# Patient Record
Sex: MALE | Race: WHITE | NOT HISPANIC OR LATINO | URBAN - METROPOLITAN AREA
[De-identification: names, ages, dates, MRNs, and addresses within clinical notes are randomized per-mention and may not be internally consistent; named-entity substitution may affect disease eponyms.]

---

## 2021-06-20 ENCOUNTER — INPATIENT (INPATIENT)
Facility: HOSPITAL | Age: 71
LOS: 1 days | Discharge: HOME HEALTH | DRG: 482 | End: 2021-06-22
Attending: STUDENT IN AN ORGANIZED HEALTH CARE EDUCATION/TRAINING PROGRAM | Admitting: STUDENT IN AN ORGANIZED HEALTH CARE EDUCATION/TRAINING PROGRAM
Payer: MEDICARE

## 2021-06-20 VITALS
HEIGHT: 70 IN | WEIGHT: 156.09 LBS | RESPIRATION RATE: 16 BRPM | DIASTOLIC BLOOD PRESSURE: 73 MMHG | HEART RATE: 70 BPM | OXYGEN SATURATION: 99 % | SYSTOLIC BLOOD PRESSURE: 133 MMHG | TEMPERATURE: 99 F

## 2021-06-20 DIAGNOSIS — S72.141A DISPLACED INTERTROCHANTERIC FRACTURE OF RIGHT FEMUR, INITIAL ENCOUNTER FOR CLOSED FRACTURE: ICD-10-CM

## 2021-06-20 DIAGNOSIS — S72.001A FRACTURE OF UNSPECIFIED PART OF NECK OF RIGHT FEMUR, INITIAL ENCOUNTER FOR CLOSED FRACTURE: ICD-10-CM

## 2021-06-20 DIAGNOSIS — C61 MALIGNANT NEOPLASM OF PROSTATE: ICD-10-CM

## 2021-06-20 DIAGNOSIS — Z01.818 ENCOUNTER FOR OTHER PREPROCEDURAL EXAMINATION: ICD-10-CM

## 2021-06-20 DIAGNOSIS — Z90.79 ACQUIRED ABSENCE OF OTHER GENITAL ORGAN(S): Chronic | ICD-10-CM

## 2021-06-20 DIAGNOSIS — E03.9 HYPOTHYROIDISM, UNSPECIFIED: ICD-10-CM

## 2021-06-20 LAB
ALBUMIN SERPL ELPH-MCNC: 4.6 G/DL — SIGNIFICANT CHANGE UP (ref 3.3–5)
ALP SERPL-CCNC: 66 U/L — SIGNIFICANT CHANGE UP (ref 40–120)
ALT FLD-CCNC: 16 U/L — SIGNIFICANT CHANGE UP (ref 10–45)
ANION GAP SERPL CALC-SCNC: 14 MMOL/L — SIGNIFICANT CHANGE UP (ref 5–17)
APPEARANCE UR: CLEAR — SIGNIFICANT CHANGE UP
APTT BLD: 30.8 SEC — SIGNIFICANT CHANGE UP (ref 27.5–35.5)
AST SERPL-CCNC: 24 U/L — SIGNIFICANT CHANGE UP (ref 10–40)
BASOPHILS # BLD AUTO: 0.02 K/UL — SIGNIFICANT CHANGE UP (ref 0–0.2)
BASOPHILS NFR BLD AUTO: 0.2 % — SIGNIFICANT CHANGE UP (ref 0–2)
BILIRUB SERPL-MCNC: 0.6 MG/DL — SIGNIFICANT CHANGE UP (ref 0.2–1.2)
BILIRUB UR-MCNC: NEGATIVE — SIGNIFICANT CHANGE UP
BLD GP AB SCN SERPL QL: NEGATIVE — SIGNIFICANT CHANGE UP
BUN SERPL-MCNC: 15 MG/DL — SIGNIFICANT CHANGE UP (ref 7–23)
CALCIUM SERPL-MCNC: 9.8 MG/DL — SIGNIFICANT CHANGE UP (ref 8.4–10.5)
CHLORIDE SERPL-SCNC: 97 MMOL/L — SIGNIFICANT CHANGE UP (ref 96–108)
CO2 SERPL-SCNC: 22 MMOL/L — SIGNIFICANT CHANGE UP (ref 22–31)
COLOR SPEC: COLORLESS — SIGNIFICANT CHANGE UP
CREAT SERPL-MCNC: 0.85 MG/DL — SIGNIFICANT CHANGE UP (ref 0.5–1.3)
DIFF PNL FLD: NEGATIVE — SIGNIFICANT CHANGE UP
EOSINOPHIL # BLD AUTO: 0.03 K/UL — SIGNIFICANT CHANGE UP (ref 0–0.5)
EOSINOPHIL NFR BLD AUTO: 0.3 % — SIGNIFICANT CHANGE UP (ref 0–6)
GLUCOSE SERPL-MCNC: 96 MG/DL — SIGNIFICANT CHANGE UP (ref 70–99)
GLUCOSE UR QL: NEGATIVE — SIGNIFICANT CHANGE UP
HCT VFR BLD CALC: 40.5 % — SIGNIFICANT CHANGE UP (ref 39–50)
HGB BLD-MCNC: 13.4 G/DL — SIGNIFICANT CHANGE UP (ref 13–17)
IMM GRANULOCYTES NFR BLD AUTO: 0.8 % — SIGNIFICANT CHANGE UP (ref 0–1.5)
INR BLD: 0.95 RATIO — SIGNIFICANT CHANGE UP (ref 0.88–1.16)
KETONES UR-MCNC: ABNORMAL
LEUKOCYTE ESTERASE UR-ACNC: NEGATIVE — SIGNIFICANT CHANGE UP
LYMPHOCYTES # BLD AUTO: 0.51 K/UL — LOW (ref 1–3.3)
LYMPHOCYTES # BLD AUTO: 5 % — LOW (ref 13–44)
MCHC RBC-ENTMCNC: 29.3 PG — SIGNIFICANT CHANGE UP (ref 27–34)
MCHC RBC-ENTMCNC: 33.1 GM/DL — SIGNIFICANT CHANGE UP (ref 32–36)
MCV RBC AUTO: 88.6 FL — SIGNIFICANT CHANGE UP (ref 80–100)
MONOCYTES # BLD AUTO: 0.61 K/UL — SIGNIFICANT CHANGE UP (ref 0–0.9)
MONOCYTES NFR BLD AUTO: 5.9 % — SIGNIFICANT CHANGE UP (ref 2–14)
NEUTROPHILS # BLD AUTO: 9.02 K/UL — HIGH (ref 1.8–7.4)
NEUTROPHILS NFR BLD AUTO: 87.8 % — HIGH (ref 43–77)
NITRITE UR-MCNC: NEGATIVE — SIGNIFICANT CHANGE UP
NRBC # BLD: 0 /100 WBCS — SIGNIFICANT CHANGE UP (ref 0–0)
PH UR: 6 — SIGNIFICANT CHANGE UP (ref 5–8)
PLATELET # BLD AUTO: 274 K/UL — SIGNIFICANT CHANGE UP (ref 150–400)
POTASSIUM SERPL-MCNC: 3.8 MMOL/L — SIGNIFICANT CHANGE UP (ref 3.5–5.3)
POTASSIUM SERPL-SCNC: 3.8 MMOL/L — SIGNIFICANT CHANGE UP (ref 3.5–5.3)
PROT SERPL-MCNC: 7.4 G/DL — SIGNIFICANT CHANGE UP (ref 6–8.3)
PROT UR-MCNC: NEGATIVE — SIGNIFICANT CHANGE UP
PROTHROM AB SERPL-ACNC: 11.4 SEC — SIGNIFICANT CHANGE UP (ref 10.6–13.6)
RBC # BLD: 4.57 M/UL — SIGNIFICANT CHANGE UP (ref 4.2–5.8)
RBC # FLD: 13.6 % — SIGNIFICANT CHANGE UP (ref 10.3–14.5)
RH IG SCN BLD-IMP: POSITIVE — SIGNIFICANT CHANGE UP
SARS-COV-2 RNA SPEC QL NAA+PROBE: SIGNIFICANT CHANGE UP
SODIUM SERPL-SCNC: 133 MMOL/L — LOW (ref 135–145)
SP GR SPEC: 1.01 — LOW (ref 1.01–1.02)
UROBILINOGEN FLD QL: NEGATIVE — SIGNIFICANT CHANGE UP
WBC # BLD: 10.27 K/UL — SIGNIFICANT CHANGE UP (ref 3.8–10.5)
WBC # FLD AUTO: 10.27 K/UL — SIGNIFICANT CHANGE UP (ref 3.8–10.5)

## 2021-06-20 PROCEDURE — 99285 EMERGENCY DEPT VISIT HI MDM: CPT | Mod: GC

## 2021-06-20 PROCEDURE — 73552 X-RAY EXAM OF FEMUR 2/>: CPT | Mod: 26,RT

## 2021-06-20 PROCEDURE — 71045 X-RAY EXAM CHEST 1 VIEW: CPT | Mod: 26

## 2021-06-20 PROCEDURE — 73502 X-RAY EXAM HIP UNI 2-3 VIEWS: CPT | Mod: 26,RT

## 2021-06-20 PROCEDURE — 99223 1ST HOSP IP/OBS HIGH 75: CPT | Mod: GC

## 2021-06-20 RX ORDER — TRAMADOL HYDROCHLORIDE 50 MG/1
50 TABLET ORAL EVERY 4 HOURS
Refills: 0 | Status: DISCONTINUED | OUTPATIENT
Start: 2021-06-21 | End: 2021-06-22

## 2021-06-20 RX ORDER — LEVOTHYROXINE SODIUM 125 MCG
25 TABLET ORAL DAILY
Refills: 0 | Status: DISCONTINUED | OUTPATIENT
Start: 2021-06-20 | End: 2021-06-20

## 2021-06-20 RX ORDER — LEVOTHYROXINE SODIUM 125 MCG
25 TABLET ORAL DAILY
Refills: 0 | Status: DISCONTINUED | OUTPATIENT
Start: 2021-06-21 | End: 2021-06-22

## 2021-06-20 RX ORDER — SENNA PLUS 8.6 MG/1
2 TABLET ORAL AT BEDTIME
Refills: 0 | Status: DISCONTINUED | OUTPATIENT
Start: 2021-06-20 | End: 2021-06-20

## 2021-06-20 RX ORDER — POLYETHYLENE GLYCOL 3350 17 G/17G
17 POWDER, FOR SOLUTION ORAL DAILY
Refills: 0 | Status: DISCONTINUED | OUTPATIENT
Start: 2021-06-21 | End: 2021-06-22

## 2021-06-20 RX ORDER — BENZOCAINE AND MENTHOL 5; 1 G/100ML; G/100ML
1 LIQUID ORAL
Refills: 0 | Status: DISCONTINUED | OUTPATIENT
Start: 2021-06-21 | End: 2021-06-22

## 2021-06-20 RX ORDER — OXYCODONE HYDROCHLORIDE 5 MG/1
2.5 TABLET ORAL EVERY 4 HOURS
Refills: 0 | Status: DISCONTINUED | OUTPATIENT
Start: 2021-06-20 | End: 2021-06-20

## 2021-06-20 RX ORDER — CHOLECALCIFEROL (VITAMIN D3) 125 MCG
2000 CAPSULE ORAL DAILY
Refills: 0 | Status: DISCONTINUED | OUTPATIENT
Start: 2021-06-21 | End: 2021-06-22

## 2021-06-20 RX ORDER — OXYCODONE HYDROCHLORIDE 5 MG/1
5 TABLET ORAL EVERY 4 HOURS
Refills: 0 | Status: DISCONTINUED | OUTPATIENT
Start: 2021-06-20 | End: 2021-06-20

## 2021-06-20 RX ORDER — MAGNESIUM HYDROXIDE 400 MG/1
30 TABLET, CHEWABLE ORAL DAILY
Refills: 0 | Status: DISCONTINUED | OUTPATIENT
Start: 2021-06-21 | End: 2021-06-22

## 2021-06-20 RX ORDER — OXYCODONE HYDROCHLORIDE 5 MG/1
5 TABLET ORAL EVERY 4 HOURS
Refills: 0 | Status: DISCONTINUED | OUTPATIENT
Start: 2021-06-21 | End: 2021-06-22

## 2021-06-20 RX ORDER — ACETAMINOPHEN 500 MG
975 TABLET ORAL EVERY 8 HOURS
Refills: 0 | Status: DISCONTINUED | OUTPATIENT
Start: 2021-06-20 | End: 2021-06-20

## 2021-06-20 RX ORDER — SENNA PLUS 8.6 MG/1
2 TABLET ORAL AT BEDTIME
Refills: 0 | Status: DISCONTINUED | OUTPATIENT
Start: 2021-06-21 | End: 2021-06-22

## 2021-06-20 RX ORDER — SODIUM CHLORIDE 9 MG/ML
1000 INJECTION, SOLUTION INTRAVENOUS
Refills: 0 | Status: DISCONTINUED | OUTPATIENT
Start: 2021-06-21 | End: 2021-06-22

## 2021-06-20 RX ORDER — OXYCODONE HYDROCHLORIDE 5 MG/1
2.5 TABLET ORAL EVERY 4 HOURS
Refills: 0 | Status: DISCONTINUED | OUTPATIENT
Start: 2021-06-21 | End: 2021-06-22

## 2021-06-20 RX ORDER — ONDANSETRON 8 MG/1
4 TABLET, FILM COATED ORAL EVERY 6 HOURS
Refills: 0 | Status: DISCONTINUED | OUTPATIENT
Start: 2021-06-21 | End: 2021-06-22

## 2021-06-20 RX ORDER — MORPHINE SULFATE 50 MG/1
4 CAPSULE, EXTENDED RELEASE ORAL ONCE
Refills: 0 | Status: DISCONTINUED | OUTPATIENT
Start: 2021-06-20 | End: 2021-06-20

## 2021-06-20 RX ORDER — LEVOTHYROXINE SODIUM 125 MCG
1 TABLET ORAL
Qty: 0 | Refills: 0 | DISCHARGE

## 2021-06-20 RX ORDER — LANOLIN ALCOHOL/MO/W.PET/CERES
3 CREAM (GRAM) TOPICAL AT BEDTIME
Refills: 0 | Status: DISCONTINUED | OUTPATIENT
Start: 2021-06-21 | End: 2021-06-22

## 2021-06-20 RX ORDER — SODIUM CHLORIDE 9 MG/ML
1000 INJECTION INTRAMUSCULAR; INTRAVENOUS; SUBCUTANEOUS
Refills: 0 | Status: DISCONTINUED | OUTPATIENT
Start: 2021-06-20 | End: 2021-06-20

## 2021-06-20 RX ORDER — ACETAMINOPHEN 500 MG
975 TABLET ORAL EVERY 8 HOURS
Refills: 0 | Status: DISCONTINUED | OUTPATIENT
Start: 2021-06-21 | End: 2021-06-22

## 2021-06-20 RX ADMIN — Medication 975 MILLIGRAM(S): at 23:07

## 2021-06-20 RX ADMIN — MORPHINE SULFATE 4 MILLIGRAM(S): 50 CAPSULE, EXTENDED RELEASE ORAL at 18:54

## 2021-06-20 RX ADMIN — SODIUM CHLORIDE 125 MILLILITER(S): 9 INJECTION INTRAMUSCULAR; INTRAVENOUS; SUBCUTANEOUS at 23:08

## 2021-06-20 NOTE — CONSULT NOTE ADULT - PROBLEM SELECTOR RECOMMENDATION 2
- c/w home levothyroxine Pending R femoral neck fracture repair by orthopedics. Patient without any history of cardiac or pulmonary disease. No AC/AP use. EKG 1st degree AV block, no ischemic changes. Good exercise capacity (>4 METS). No tobacco, drug or etoh use. No history of anesthetic complications.     RCRI score 0 (Class I risk)  ALPHONSE score 0%    Patient is low risk for intermediate risk procedure. No further medical optimization warranted. Acceptable to proceed with urgent surgery without further medical work-up.

## 2021-06-20 NOTE — CONSULT NOTE ADULT - ASSESSMENT
70 y/o M PMHx prostate CA (s/p radical prostatectomy in 03/2021), chronic back pain, and hypothyroidism, initially presented following mechanical fall at home. Admitted for R femoral neck fracture, pending OR with ortho. Medicine consulted for pre-op risk stratification.

## 2021-06-20 NOTE — ED PROVIDER NOTE - CLINICAL SUMMARY MEDICAL DECISION MAKING FREE TEXT BOX
71M w/ h/o prostate ca, s/p prostatectomy, PET scan last week that was negative, presents w/ mechanical fall and R hip pain, will xray to r/o fracture, pain control, no syncope, no loc, no lightheadedness, no chest pain

## 2021-06-20 NOTE — H&P ADULT - NSHPPHYSICALEXAM_GEN_ALL_CORE
RLE:  Skin intact; No ecchymosis/soft tissue swelling  Compartments soft; + TTP about hip. No TTP to knee/leg/ankle/foot   ROM lmited 2/2 pain   Unable to SLR; + Heel Strike  Motor intact GS/TA/FHL/EHL  SILT L2-S1  DP/PT pulses 2+    LLE/BUE:   No bony TTP; Good ROM w/o pain; Exam Unremarkable

## 2021-06-20 NOTE — ED PROVIDER NOTE - PHYSICAL EXAMINATION
CONSTITUTIONAL: NAD, awake, alert  HEAD: Normocephalic; atraumatic  ENMT: External appears normal, MMM  CARD: Normal Sl, S2; no audible murmurs  RESP: normal wob, lungs ctab  ABD: soft, non-distended; non-tender  MSK: + R hip tenderness, R leg is externally rotated, pulse intact, good cap refill, no edema  SKIN: Warm, dry, no rashes  NEURO: aaox3, moving all extremities spontaneously

## 2021-06-20 NOTE — H&P ADULT - HISTORY OF PRESENT ILLNESS
71[facility]/o severe R hip pain and inability to ambulate.  Patient denies headstrike or LOC. Patient denies radiation of pain. Patient denies numbness/tingling/burning in the RLE. No other bone/joint complaints. Patient is a community ambulator at baseline with/without assistive devices.                                    13.4   10.27 )-----------( 274      ( 20 Jun 2021 18:00 )             40.5     06-20    133<L>  |  97  |  15  ----------------------------<  96  3.8   |  22  |  0.85    Ca    9.8      20 Jun 2021 18:00    TPro  7.4  /  Alb  4.6  /  TBili  0.6  /  DBili  x   /  AST  24  /  ALT  16  /  AlkPhos  66  06-20    PT/INR - ( 20 Jun 2021 18:01 )   PT: 11.4 sec;   INR: 0.95 ratio         PTT - ( 20 Jun 2021 18:01 )  PTT:30.8 sec    T(C): 36.8 (06-20-21 @ 18:07), Max: 37.1 (06-20-21 @ 17:33)  HR: 68 (06-20-21 @ 19:08) (68 - 72)  BP: 127/81 (06-20-21 @ 19:08) (127/81 - 133/73)  RR: 15 (06-20-21 @ 19:08) (15 - 18)  SpO2: 100% (06-20-21 @ 19:08) (95% - 100%)  Wt(kg): --

## 2021-06-20 NOTE — H&P ADULT - ASSESSMENT
A/P: 71y Male with right intertroch fracture, going to OR tonight for IMN    - Admit to ortho  - Pain control  - NPO/IVF  - CBC/BMP/Coags/UA/T+S x2  - EKG/CXR  - Medical clearance  - Plan for OR for ORIF tonight

## 2021-06-20 NOTE — ED ADULT NURSE REASSESSMENT NOTE - NS ED NURSE REASSESS COMMENT FT1
patient completely undressed for OR. patients clothing, belongings, and valuables including cell phone, wallet, and apple watch secured with patients wife

## 2021-06-20 NOTE — ED ADULT NURSE NOTE - NSIMPLEMENTINTERV_GEN_ALL_ED
Implemented All Fall Risk Interventions:  [place] to call system. Call bell, personal items and telephone within reach. Instruct patient to call for assistance. Room bathroom lighting operational. Non-slip footwear when patient is off stretcher. Physically safe environment: no spills, clutter or unnecessary equipment. Stretcher in lowest position, wheels locked, appropriate side rails in place. Provide visual cue, wrist band, yellow gown, etc. Monitor gait and stability. Monitor for mental status changes and reorient to person, place, and time. Review medications for side effects contributing to fall risk. Reinforce activity limits and safety measures with patient and family.

## 2021-06-20 NOTE — ED PROVIDER NOTE - NS ED ROS FT
General: denies fever, chills  HENT: denies nasal congestion, rhinorrhea  Eyes: denies visual changes, blurred vision  CV: denies chest pain, palpitations  Resp: denies difficulty breathing, cough  Abdominal: denies nausea, vomiting, diarrhea, abdominal pain  MSK: denies muscle aches, leg swelling, + R hip pain  Neuro: denies headaches, numbness, tingling  Skin: denies rashes, bruises  Heme: denies petechia, abnormal bleeding

## 2021-06-20 NOTE — ED PROVIDER NOTE - OBJECTIVE STATEMENT
71M hx prostate ca s/p radical prostatectomy 3/1, spinal radiculopathy chronic (neck pain/ low back pain), hypothyroidism, who presents w/ right hip pain s/p mechanical fall at home, pt recalls full event, states he slipped, denies head trauma/ LOC or blood thinners. Denies chest pain or new back pain. Received morphine 4mg at approx 1650 by ems.

## 2021-06-20 NOTE — ED PROVIDER NOTE - ATTENDING CONTRIBUTION TO CARE
Mcgee DO: 71M hx prostate ca s/p radical prostatectomy 3/1, spinal radiculopathy chronic (neck pain/ low back pain), hypothyroidism, who presents w/ right hip pain s/p mechanical fall at home, pt recalls full event, states he slipped, denies head trauma/ LOC or blood thinners. Denies chest pain or new back pain. Received morphine 4mg at approx 1650 by ems. On eval aox3, nad, heart s1s2 no murmurs, lungs cta b/l, abd soft ntnd, no chest wall ttp, ROM of all extremities wnl except for R hip limited ROM due hip pain, pelvis stable, 2+ DP pulses b/l, sensation intact. Plan: rule out fx right hip/ femur, pre-op labs, analgesia, possible admission if cannot ambulate.

## 2021-06-20 NOTE — CONSULT NOTE ADULT - SUBJECTIVE AND OBJECTIVE BOX
CC: Patient is a 71y old  Male who presents with a chief complaint of R hip pain    HPI:  72 y/o M PMHx prostate CA (s/p radical prostatectomy in 03/2021), chronic back pain, and hypothyroidism, initially presented following mechanical fall at home. Found to have R femoral neck fracture in the ED. Ortho admitted patient and planning to take to OR urgently, medicine consulted for pre-op risk stratification. Patient reports mechanical fall at home today, slipped and fell, no syncope/LOC or head trauma. Notes pain in R hip with movement. Had recent radical prostatectomy in 03/2021 without complications. No prior adverse effects from anesthesia. At baseline, independent in all ADLs, ambulates independently. Reports good exercise tolerance, able to climb several flights of stairs without SOB/CP. No prior cardiac history. No history of easy bleeding, not on any AC/AP. No history of tobacco use. Drinks socially, no drug use.     HOME MEDICATIONS:  Synthroid 25mcg daily    PAST MEDICAL & SURGICAL HISTORY:  Prostate cancer  Hypothyroidism    FAMILY Hx:  No significant family history of cardiac disease    REVIEW OF SYSTEMS:  CONSTITUTIONAL: No fever, weight loss, or fatigue  EYES: No eye pain, visual disturbances, or discharge  ENMT:  No difficulty hearing, tinnitus, vertigo; No sinus or throat pain  NECK: No pain or stiffness  RESPIRATORY: No cough, wheezing, or shortness of breath  CARDIOVASCULAR: No chest pain, palpitations, dizziness, or leg swelling  GASTROINTESTINAL: No abdominal or epigastric pain. No nausea, vomiting, or hematochezia.  GENITOURINARY: No dysuria, frequency, hematuria, or incontinence  NEUROLOGICAL: No headaches, numbness, or tremors  SKIN: No itching, burning, rashes, or lesions   ENDOCRINE: No heat or cold intolerance; No hair loss  MUSCULOSKELETAL: +R hip pain, +Lower back pain   HEME/LYMPH: No easy bruising or bleeding gums      T(C): 36.8 (06-20-21 @ 18:07), Max: 37.1 (06-20-21 @ 17:33)  HR: 68 (06-20-21 @ 19:08) (68 - 72)  BP: 127/81 (06-20-21 @ 19:08) (127/81 - 133/73)  RR: 15 (06-20-21 @ 19:08) (15 - 18)  SpO2: 100% (06-20-21 @ 19:08) (95% - 100%)  Wt(kg): --Vital Signs Last 24 Hrs  T(C): 36.8 (20 Jun 2021 18:07), Max: 37.1 (20 Jun 2021 17:33)  T(F): 98.2 (20 Jun 2021 18:07), Max: 98.8 (20 Jun 2021 17:33)  HR: 68 (20 Jun 2021 19:08) (68 - 72)  BP: 127/81 (20 Jun 2021 19:08) (127/81 - 133/73)  BP(mean): --  RR: 15 (20 Jun 2021 19:08) (15 - 18)  SpO2: 100% (20 Jun 2021 19:08) (95% - 100%)    PHYSICAL EXAM:  GENERAL: NAD, well-developed  HEAD:  Atraumatic, Normocephalic  EYES: EOMI, PERRLA, conjunctiva and sclera clear  ENMT: No tonsillar erythema, exudates, or enlargement; Moist mucous membranes  NECK: Supple, No JVD  NERVOUS SYSTEM:  Alert & Oriented X3, CNs grossly intact  CHEST/LUNG: Clear to auscultation bilaterally; No rales, rhonchi, wheezing, or rubs  HEART: Regular rate and rhythm; No murmurs, rubs, or gallops  ABDOMEN: Soft, Nontender, Nondistended; Bowel sounds present  EXTREMITIES:  2+ Peripheral Pulses, No clubbing, cyanosis, or edema  SKIN: No rashes or lesions      Consultant(s) Notes Reviewed:  [x ] YES  [ ] NO  Care Discussed with Consultants/Other Providers [ x] YES  [ ] NO    LABS:  CBC   06-20-21 @ 18:00  Hematcorit 40.5  Hemoglobin 13.4  Mean Cell Hemoglobin 29.3  Platelet Count-Automated 274  RBC Count 4.57  Red Cell Distrib Width 13.6  Wbc Count 10.27    BMP  06-20-21 @ 18:00  Anion Gap. Serum 14  Blood Urea Nitrogen,Serm 15  Calcium, Total Serum 9.8  Carbon Dioxide, Serum 22  Chloride, Serum 97  Creatinine, Serum 0.85  eGFR in  101  eGFR in Non Afican American 87  Gloucose, serum 96  Potassium, Serum 3.8  Sodium, Serum 133    CMP  06-20-21 @ 18:00  Jeanie Aminotransferase(ALT/SGPT)16  Albumin, Serum 4.6  Alkaline Phosphatase, Serum 66  Anion Gap, Serum 14  Aspartate Aminotransferase (AST/SGOT)24  Bilirubin Total, Serum 0.6  Blood Urea Nitrogen, Serum 15  Calcium,Total Serum 9.8  Carbon Dioxide, Serum 22  Chloride, Serum 97  Creatinine, Serum 0.85  eGFR if  101  eGFR if Non African American 87  Glucose, Serum 96  Potassium, Serum 3.8  Protein Total, Serum 7.4  Sodium, Serum 133      PT/INR  PT/INR  06-20-21 @ 18:01  INR 0.95  Prothrombin Time Comment --  Prothrobin Time, [id number]        RADIOLOGY & ADDITIONAL TESTS:  < from: Xray Hip w/ Pelvis 2 or 3 Views, Right (06.20.21 @ 18:20) >  EXAM:  XR HIP WITH PELV 2-3V RT                        PROCEDURE DATE:  06/20/2021    ******PRELIMINARY REPORT******    ******PRELIMINARY REPORT******        INTERPRETATION:  Acute right femoral neck fracture. F/u official report.        Imaging Personally Reviewed:  [ x ] YES  [ ] NO CC: Patient is a 71y old  Male who presents with a chief complaint of R hip pain    HPI:  72 y/o M PMHx prostate CA (s/p radical prostatectomy in 03/2021), chronic back pain, and hypothyroidism, initially presented following mechanical fall at home. Found to have R femoral neck fracture in the ED. Ortho admitted patient and planning to take to OR urgently, medicine consulted for pre-op risk stratification. Patient reports mechanical fall at home today, slipped and fell, no syncope/LOC or head trauma. Notes pain in R hip with movement. Had recent radical prostatectomy in 03/2021 without complications. No prior adverse effects from anesthesia. At baseline, independent in all ADLs, ambulates independently. Reports good exercise tolerance, able to climb several flights of stairs without SOB/CP. No prior cardiac history. No history of easy bleeding, not on any AC/AP. No history of tobacco use. Drinks socially, no drug use.     HOME MEDICATIONS:  Synthroid 25mcg daily    PAST MEDICAL & SURGICAL HISTORY:  Prostate cancer  Hypothyroidism    FAMILY Hx:  No significant family history of cardiac disease    REVIEW OF SYSTEMS:  CONSTITUTIONAL: No fever, weight loss, or fatigue  EYES: No eye pain, visual disturbances, or discharge  ENMT:  No difficulty hearing, tinnitus, vertigo; No sinus or throat pain  NECK: No pain or stiffness  RESPIRATORY: No cough, wheezing, or shortness of breath  CARDIOVASCULAR: No chest pain, palpitations, dizziness, or leg swelling  GASTROINTESTINAL: No abdominal or epigastric pain. No nausea, vomiting, or hematochezia.  GENITOURINARY: No dysuria, frequency, hematuria, or incontinence  NEUROLOGICAL: No headaches, numbness, or tremors  SKIN: No itching, burning, rashes, or lesions   ENDOCRINE: No heat or cold intolerance; No hair loss  MUSCULOSKELETAL: +R hip pain, +Lower back pain   HEME/LYMPH: No easy bruising or bleeding gums      T(C): 36.8 (06-20-21 @ 18:07), Max: 37.1 (06-20-21 @ 17:33)  HR: 68 (06-20-21 @ 19:08) (68 - 72)  BP: 127/81 (06-20-21 @ 19:08) (127/81 - 133/73)  RR: 15 (06-20-21 @ 19:08) (15 - 18)  SpO2: 100% (06-20-21 @ 19:08) (95% - 100%)  Wt(kg): --Vital Signs Last 24 Hrs  T(C): 36.8 (20 Jun 2021 18:07), Max: 37.1 (20 Jun 2021 17:33)  T(F): 98.2 (20 Jun 2021 18:07), Max: 98.8 (20 Jun 2021 17:33)  HR: 68 (20 Jun 2021 19:08) (68 - 72)  BP: 127/81 (20 Jun 2021 19:08) (127/81 - 133/73)  BP(mean): --  RR: 15 (20 Jun 2021 19:08) (15 - 18)  SpO2: 100% (20 Jun 2021 19:08) (95% - 100%)    PHYSICAL EXAM:  GENERAL: NAD, well-developed  HEAD:  Atraumatic, Normocephalic  EYES: EOMI, PERRLA, conjunctiva and sclera clear  ENMT: No tonsillar erythema, exudates, or enlargement; Moist mucous membranes  NECK: Supple, No JVD  NERVOUS SYSTEM:  Alert & Oriented X3, CNs grossly intact  CHEST/LUNG: Clear to auscultation bilaterally; No rales, rhonchi, wheezing, or rubs  HEART: Regular rate and rhythm; No murmurs, rubs, or gallops  ABDOMEN: Soft, Nontender, Nondistended; Bowel sounds present  EXTREMITIES:  2+ Peripheral Pulses, No clubbing, cyanosis, or edema  SKIN: No rashes or lesions      Consultant(s) Notes Reviewed:  [x ] YES  [ ] NO  Care Discussed with Consultants/Other Providers [ x] YES  [ ] NO    LABS:  CBC   06-20-21 @ 18:00  Hematcorit 40.5  Hemoglobin 13.4  Mean Cell Hemoglobin 29.3  Platelet Count-Automated 274  RBC Count 4.57  Red Cell Distrib Width 13.6  Wbc Count 10.27    BMP  06-20-21 @ 18:00  Anion Gap. Serum 14  Blood Urea Nitrogen,Serm 15  Calcium, Total Serum 9.8  Carbon Dioxide, Serum 22  Chloride, Serum 97  Creatinine, Serum 0.85  eGFR in  101  eGFR in Non Afican American 87  Gloucose, serum 96  Potassium, Serum 3.8  Sodium, Serum 133    CMP  06-20-21 @ 18:00  Jeanie Aminotransferase(ALT/SGPT)16  Albumin, Serum 4.6  Alkaline Phosphatase, Serum 66  Anion Gap, Serum 14  Aspartate Aminotransferase (AST/SGOT)24  Bilirubin Total, Serum 0.6  Blood Urea Nitrogen, Serum 15  Calcium,Total Serum 9.8  Carbon Dioxide, Serum 22  Chloride, Serum 97  Creatinine, Serum 0.85  eGFR if  101  eGFR if Non African American 87  Glucose, Serum 96  Potassium, Serum 3.8  Protein Total, Serum 7.4  Sodium, Serum 133      PT/INR  PT/INR  06-20-21 @ 18:01  INR 0.95  Prothrombin Time Comment --  Prothrobin Time, [id number]        RADIOLOGY & ADDITIONAL TESTS:  < from: Xray Hip w/ Pelvis 2 or 3 Views, Right (06.20.21 @ 18:20) >  EXAM:  XR HIP WITH PELV 2-3V RT                        PROCEDURE DATE:  06/20/2021    ******PRELIMINARY REPORT******    ******PRELIMINARY REPORT******        INTERPRETATION:  Acute right femoral neck fracture. F/u official report.      EKG: First degree AV block, no ST-TW changes  Imaging Personally Reviewed:  [ x ] YES  [ ] NO CC: Patient is a 71y old  Male who presents with a chief complaint of R hip pain    HPI:  72 y/o M PMHx prostate CA (s/p radical prostatectomy in 03/2021), chronic back pain, and hypothyroidism, initially presented following mechanical fall at home. Found to have R femoral neck fracture in the ED. Ortho admitted patient and planning to take to OR urgently, medicine consulted for pre-op risk stratification. Patient reports mechanical fall at home today, slipped and fell, no syncope/LOC or head trauma. Notes pain in R hip with movement. Had recent radical prostatectomy in 03/2021 without complications. No prior adverse effects from anesthesia. At baseline, independent in all ADLs, ambulates independently. Reports good exercise tolerance, able to climb several flights of stairs without SOB/CP. No prior cardiac history. No history of easy bleeding, not on any AC/AP. No history of tobacco use. Drinks socially, no drug use.     HOME MEDICATIONS:  Synthroid 25mcg daily    PAST MEDICAL & SURGICAL HISTORY:  Prostate cancer  Hypothyroidism    FAMILY Hx:  No significant family history of cardiac disease    REVIEW OF SYSTEMS:  CONSTITUTIONAL: No fever, weight loss, or fatigue  EYES: No eye pain, visual disturbances, or discharge  ENMT:  No difficulty hearing, tinnitus, vertigo; No sinus or throat pain  NECK: No pain or stiffness  RESPIRATORY: No cough, wheezing, or shortness of breath  CARDIOVASCULAR: No chest pain, palpitations, dizziness, or leg swelling  GASTROINTESTINAL: No abdominal or epigastric pain. No nausea, vomiting, or hematochezia.  GENITOURINARY: No dysuria, frequency, hematuria, or incontinence  NEUROLOGICAL: No headaches, numbness, or tremors  SKIN: No itching, burning, rashes, or lesions   ENDOCRINE: No heat or cold intolerance; No hair loss  MUSCULOSKELETAL: +R hip pain, +Lower back pain   HEME/LYMPH: No easy bruising or bleeding gums      T(C): 36.8 (06-20-21 @ 18:07), Max: 37.1 (06-20-21 @ 17:33)  HR: 68 (06-20-21 @ 19:08) (68 - 72)  BP: 127/81 (06-20-21 @ 19:08) (127/81 - 133/73)  RR: 15 (06-20-21 @ 19:08) (15 - 18)  SpO2: 100% (06-20-21 @ 19:08) (95% - 100%)  Wt(kg): --Vital Signs Last 24 Hrs  T(C): 36.8 (20 Jun 2021 18:07), Max: 37.1 (20 Jun 2021 17:33)  T(F): 98.2 (20 Jun 2021 18:07), Max: 98.8 (20 Jun 2021 17:33)  HR: 68 (20 Jun 2021 19:08) (68 - 72)  BP: 127/81 (20 Jun 2021 19:08) (127/81 - 133/73)  BP(mean): --  RR: 15 (20 Jun 2021 19:08) (15 - 18)  SpO2: 100% (20 Jun 2021 19:08) (95% - 100%)    PHYSICAL EXAM:  GENERAL: NAD, well-developed  HEAD:  Atraumatic, Normocephalic  EYES: EOMI, PERRLA, conjunctiva and sclera clear  ENMT: No tonsillar erythema, exudates, or enlargement; Moist mucous membranes  NECK: Supple, No JVD  NERVOUS SYSTEM:  Alert & Oriented X3, CNs grossly intact  CHEST/LUNG: Clear to auscultation bilaterally; No rales, rhonchi, wheezing, or rubs  HEART: Regular rate and rhythm; No murmurs, rubs, or gallops  ABDOMEN: Soft, Nontender, Nondistended; Bowel sounds present  EXTREMITIES:  2+ Peripheral Pulses, No clubbing, cyanosis, or edema  SKIN: No rashes or lesions      Consultant(s) Notes Reviewed:  [x ] YES  [ ] NO  Care Discussed with Consultants/Other Providers [ x] YES  [ ] NO    LABS:  CBC   06-20-21 @ 18:00  Hematcorit 40.5  Hemoglobin 13.4  Mean Cell Hemoglobin 29.3  Platelet Count-Automated 274  RBC Count 4.57  Red Cell Distrib Width 13.6  Wbc Count 10.27    BMP  06-20-21 @ 18:00  Anion Gap. Serum 14  Blood Urea Nitrogen,Serm 15  Calcium, Total Serum 9.8  Carbon Dioxide, Serum 22  Chloride, Serum 97  Creatinine, Serum 0.85  eGFR in  101  eGFR in Non Afican American 87  Gloucose, serum 96  Potassium, Serum 3.8  Sodium, Serum 133    CMP  06-20-21 @ 18:00  Jeanie Aminotransferase(ALT/SGPT)16  Albumin, Serum 4.6  Alkaline Phosphatase, Serum 66  Anion Gap, Serum 14  Aspartate Aminotransferase (AST/SGOT)24  Bilirubin Total, Serum 0.6  Blood Urea Nitrogen, Serum 15  Calcium,Total Serum 9.8  Carbon Dioxide, Serum 22  Chloride, Serum 97  Creatinine, Serum 0.85  eGFR if  101  eGFR if Non African American 87  Glucose, Serum 96  Potassium, Serum 3.8  Protein Total, Serum 7.4  Sodium, Serum 133      PT/INR  PT/INR  06-20-21 @ 18:01  INR 0.95  Prothrombin Time Comment --  Prothrobin Time, [id number]        RADIOLOGY & ADDITIONAL TESTS:  < from: Xray Hip w/ Pelvis 2 or 3 Views, Right (06.20.21 @ 18:20) >  EXAM:  XR HIP WITH PELV 2-3V RT                        PROCEDURE DATE:  06/20/2021    ******PRELIMINARY REPORT******    ******PRELIMINARY REPORT******        INTERPRETATION:  Acute right femoral neck fracture. F/u official report.      Imaging Personally Reviewed:  [ x ] YES  [ ] NO      EKG: First degree AV block, no ST-TW changes   CC: Patient is a 71y old  Male who presents with a chief complaint of R hip pain    HPI:  70 y/o M PMHx prostate CA (s/p radical prostatectomy in 03/2021), chronic back pain, and hypothyroidism, initially presented following mechanical fall at home. Found to have R femoral neck fracture in the ED. Ortho admitted patient and planning to take to OR urgently, medicine consulted for pre-op risk stratification. Patient reports mechanical fall at home today, slipped and fell, no syncope/LOC or head trauma. Notes pain in R hip with movement. Had recent radical prostatectomy in 03/2021 without complications. No prior adverse effects from anesthesia. At baseline, independent in all ADLs, ambulates independently. Reports good exercise tolerance, able to climb several flights of stairs without SOB/CP. No prior cardiac history. No history of easy bleeding, not on any AC/AP. No history of tobacco use. Drinks socially, no drug use.     HOME MEDICATIONS:  Synthroid 25mcg daily  Tadalafil 5mg PRN    PAST MEDICAL & SURGICAL HISTORY:  Prostate cancer  Hypothyroidism    FAMILY Hx:  Denies MI in family    SOCIAL: DENIES TOBACCO and has rare social alcohol    ALLERGY:NKDA    REVIEW OF SYSTEMS:  CONSTITUTIONAL: No fever, weight loss, or fatigue  EYES: No eye pain, visual disturbances, or discharge  ENMT:  No difficulty hearing, tinnitus, vertigo; No sinus or throat pain  NECK: No pain or stiffness  RESPIRATORY: No cough, wheezing, or shortness of breath  CARDIOVASCULAR: No chest pain, palpitations, dizziness, or leg swelling  GASTROINTESTINAL: No abdominal or epigastric pain. No nausea, vomiting, or hematochezia.  GENITOURINARY: No dysuria, frequency, hematuria, or incontinence  NEUROLOGICAL: No headaches, numbness, or tremors  SKIN: No itching, burning, rashes, or lesions   ENDOCRINE: No heat or cold intolerance; No hair loss  MUSCULOSKELETAL: +R hip pain, +Lower back pain   HEME/LYMPH: No easy bruising or bleeding gums      T(C): 36.8 (06-20-21 @ 18:07), Max: 37.1 (06-20-21 @ 17:33)  HR: 68 (06-20-21 @ 19:08) (68 - 72)  BP: 127/81 (06-20-21 @ 19:08) (127/81 - 133/73)  RR: 15 (06-20-21 @ 19:08) (15 - 18)  SpO2: 100% (06-20-21 @ 19:08) (95% - 100%)  Wt(kg): --Vital Signs Last 24 Hrs  T(C): 36.8 (20 Jun 2021 18:07), Max: 37.1 (20 Jun 2021 17:33)  T(F): 98.2 (20 Jun 2021 18:07), Max: 98.8 (20 Jun 2021 17:33)  HR: 68 (20 Jun 2021 19:08) (68 - 72)  BP: 127/81 (20 Jun 2021 19:08) (127/81 - 133/73)  RR: 15 (20 Jun 2021 19:08) (15 - 18)  SpO2: 100% (20 Jun 2021 19:08) (95% - 100%) on RA    PHYSICAL EXAM:  GENERAL: NAD, well-developed  HEAD:  Atraumatic, Normocephalic  EYES: EOMI, PERRLA, conjunctiva and sclera clear  ENMT: No tonsillar erythema, exudates, or enlargement; Moist mucous membranes  NECK: Supple, No JVD  NERVOUS SYSTEM:  Alert & Oriented X3, CNs grossly intact  CHEST/LUNG: Clear to auscultation bilaterally; No rales, rhonchi, wheezing, or rubs  HEART: Regular rate and rhythm; No murmurs, rubs, or gallops  ABDOMEN: Soft, Nontender, Nondistended; Bowel sounds present  EXTREMITIES:  2+ Peripheral Pulses, No clubbing, cyanosis, or edema  SKIN: No rashes or lesions      Consultant(s) Notes Reviewed:  [x ] YES  [ ] NO  Care Discussed with Consultants/Other Providers [ x] YES  [ ] NO    LABS:  CBC   06-20-21 @ 18:00  Hematcorit 40.5  Hemoglobin 13.4  Mean Cell Hemoglobin 29.3  Platelet Count-Automated 274  RBC Count 4.57  Red Cell Distrib Width 13.6  Wbc Count 10.27    BMP  06-20-21 @ 18:00  Anion Gap. Serum 14  Blood Urea Nitrogen,Serm 15  Calcium, Total Serum 9.8  Carbon Dioxide, Serum 22  Chloride, Serum 97  Creatinine, Serum 0.85  eGFR in  101  eGFR in Non Afican American 87  Gloucose, serum 96  Potassium, Serum 3.8  Sodium, Serum 133    CMP  06-20-21 @ 18:00  Jeanie Aminotransferase(ALT/SGPT)16  Albumin, Serum 4.6  Alkaline Phosphatase, Serum 66  Anion Gap, Serum 14  Aspartate Aminotransferase (AST/SGOT)24  Bilirubin Total, Serum 0.6  Blood Urea Nitrogen, Serum 15  Calcium,Total Serum 9.8  Carbon Dioxide, Serum 22  Chloride, Serum 97  Creatinine, Serum 0.85  eGFR if  101  eGFR if Non African American 87  Glucose, Serum 96  Potassium, Serum 3.8  Protein Total, Serum 7.4  Sodium, Serum 133      PT/INR  PT/INR  06-20-21 @ 18:01  INR 0.95  Prothrombin Time Comment --  Prothrobin Time, [id number]        RADIOLOGY & ADDITIONAL TESTS:  < from: Xray Hip w/ Pelvis 2 or 3 Views, Right (06.20.21 @ 18:20) >  EXAM:  XR HIP WITH PELV 2-3V RT                        PROCEDURE DATE:  06/20/2021    ******PRELIMINARY REPORT******    ******PRELIMINARY REPORT******        INTERPRETATION:  Acute right femoral neck fracture. F/u official report.    CXR does not demonstrate lobar opacification    Imaging Personally Reviewed:  [ x ] YES  [ ] NO    EKG sinus 1st degree AV block ()      EKG: First degree AV block, no ST-TW changes

## 2021-06-20 NOTE — CONSULT NOTE ADULT - PROBLEM SELECTOR RECOMMENDATION 9
Pending R femoral neck fracture repair by orthopedics. Patient without any history of cardiac or pulmonary disease. No AC/AP use. EKG NSR, no ischemic changes. Good exercise capacity (>4 METS). No tobacco, drug or etoh use. No history of anesthetic complications.     RCRI score 0 (Class I risk)  ALPHONSE score 0%    Patient is low risk for intermediate risk procedure. No further medical optimization warranted. Acceptable to proceed with urgent surgery without further medical work-up. Pending R femoral neck fracture repair by orthopedics. Patient without any history of cardiac or pulmonary disease. No AC/AP use. EKG 1st degree AV block, no ischemic changes. Good exercise capacity (>4 METS). No tobacco, drug or etoh use. No history of anesthetic complications.     RCRI score 0 (Class I risk)  ALPHONSE score 0%    Patient is low risk for intermediate risk procedure. No further medical optimization warranted. Acceptable to proceed with urgent surgery without further medical work-up. - per ortho pending IMN

## 2021-06-20 NOTE — ED ADULT NURSE NOTE - OBJECTIVE STATEMENT
71Male BIBA  hx prostate ca s/p radical prostatectomy 3/1, spinal radiculopathy chronic (neck pain/ low back pain), hypothyroidism, who presents w/ right hip pain s/p mechanical fall at home, pt recalls full event, states he slipped, denies head trauma/ LOC or blood thinners. Denies chest pain or new back pain. Received morphine 4mg at approx 1650 by ems.  IVL placed by EMS 20 G patent Bloods sent as ordered

## 2021-06-20 NOTE — CONSULT NOTE ADULT - ATTENDING COMMENTS
71M p/w Right femoral neck fracture pending IMN  - cardiopulmonary benign. denies syncope, cp, sob, change in exercise tolerance prior to event (reports ability to climb 20 flights of stairs before fatigue)  - low risk for intermediate risk procedure. may proceed with surgery.  - please reconsult medicine as needed should patient status change      Jonas Machuca MD  Medicine Attending  Department of Hospital Medicine  pager: [phone] (available from 20:00 to 08:00)

## 2021-06-21 DIAGNOSIS — S72.141A DISPLACED INTERTROCHANTERIC FRACTURE OF RIGHT FEMUR, INITIAL ENCOUNTER FOR CLOSED FRACTURE: ICD-10-CM

## 2021-06-21 LAB
ANION GAP SERPL CALC-SCNC: 15 MMOL/L — SIGNIFICANT CHANGE UP (ref 5–17)
BUN SERPL-MCNC: 12 MG/DL — SIGNIFICANT CHANGE UP (ref 7–23)
CALCIUM SERPL-MCNC: 9.3 MG/DL — SIGNIFICANT CHANGE UP (ref 8.4–10.5)
CHLORIDE SERPL-SCNC: 99 MMOL/L — SIGNIFICANT CHANGE UP (ref 96–108)
CO2 SERPL-SCNC: 21 MMOL/L — LOW (ref 22–31)
COVID-19 SPIKE DOMAIN AB INTERP: POSITIVE
COVID-19 SPIKE DOMAIN ANTIBODY RESULT: >250 U/ML — HIGH
CREAT SERPL-MCNC: 0.82 MG/DL — SIGNIFICANT CHANGE UP (ref 0.5–1.3)
CULTURE RESULTS: NO GROWTH — SIGNIFICANT CHANGE UP
GLUCOSE SERPL-MCNC: 148 MG/DL — HIGH (ref 70–99)
HCT VFR BLD CALC: 40.5 % — SIGNIFICANT CHANGE UP (ref 39–50)
HCV AB S/CO SERPL IA: 0.1 S/CO — SIGNIFICANT CHANGE UP (ref 0–0.99)
HCV AB SERPL-IMP: SIGNIFICANT CHANGE UP
HGB BLD-MCNC: 13.5 G/DL — SIGNIFICANT CHANGE UP (ref 13–17)
MCHC RBC-ENTMCNC: 29.5 PG — SIGNIFICANT CHANGE UP (ref 27–34)
MCHC RBC-ENTMCNC: 33.3 GM/DL — SIGNIFICANT CHANGE UP (ref 32–36)
MCV RBC AUTO: 88.4 FL — SIGNIFICANT CHANGE UP (ref 80–100)
NRBC # BLD: 0 /100 WBCS — SIGNIFICANT CHANGE UP (ref 0–0)
PLATELET # BLD AUTO: 274 K/UL — SIGNIFICANT CHANGE UP (ref 150–400)
POTASSIUM SERPL-MCNC: 4 MMOL/L — SIGNIFICANT CHANGE UP (ref 3.5–5.3)
POTASSIUM SERPL-SCNC: 4 MMOL/L — SIGNIFICANT CHANGE UP (ref 3.5–5.3)
PSA FLD-MCNC: 0.31 NG/ML — SIGNIFICANT CHANGE UP (ref 0–4)
RBC # BLD: 4.58 M/UL — SIGNIFICANT CHANGE UP (ref 4.2–5.8)
RBC # FLD: 13.5 % — SIGNIFICANT CHANGE UP (ref 10.3–14.5)
SARS-COV-2 IGG+IGM SERPL QL IA: >250 U/ML — HIGH
SARS-COV-2 IGG+IGM SERPL QL IA: POSITIVE
SODIUM SERPL-SCNC: 135 MMOL/L — SIGNIFICANT CHANGE UP (ref 135–145)
SPECIMEN SOURCE: SIGNIFICANT CHANGE UP
WBC # BLD: 11.06 K/UL — HIGH (ref 3.8–10.5)
WBC # FLD AUTO: 11.06 K/UL — HIGH (ref 3.8–10.5)

## 2021-06-21 RX ORDER — CEFAZOLIN SODIUM 1 G
2000 VIAL (EA) INJECTION EVERY 8 HOURS
Refills: 0 | Status: COMPLETED | OUTPATIENT
Start: 2021-06-21 | End: 2021-06-21

## 2021-06-21 RX ORDER — RIVAROXABAN 15 MG-20MG
1 KIT ORAL
Qty: 0 | Refills: 0 | DISCHARGE
Start: 2021-06-21

## 2021-06-21 RX ORDER — DIPHENHYDRAMINE HCL 50 MG
25 CAPSULE ORAL EVERY 4 HOURS
Refills: 0 | Status: DISCONTINUED | OUTPATIENT
Start: 2021-06-21 | End: 2021-06-22

## 2021-06-21 RX ORDER — SENNA PLUS 8.6 MG/1
2 TABLET ORAL
Qty: 0 | Refills: 0 | DISCHARGE
Start: 2021-06-21

## 2021-06-21 RX ORDER — ONDANSETRON 8 MG/1
4 TABLET, FILM COATED ORAL ONCE
Refills: 0 | Status: DISCONTINUED | OUTPATIENT
Start: 2021-06-21 | End: 2021-06-21

## 2021-06-21 RX ORDER — OXYCODONE HYDROCHLORIDE 5 MG/1
1 TABLET ORAL
Qty: 0 | Refills: 0 | DISCHARGE
Start: 2021-06-21

## 2021-06-21 RX ORDER — ACETAMINOPHEN 500 MG
3 TABLET ORAL
Qty: 0 | Refills: 0 | DISCHARGE
Start: 2021-06-21

## 2021-06-21 RX ORDER — RIVAROXABAN 15 MG-20MG
10 KIT ORAL DAILY
Refills: 0 | Status: DISCONTINUED | OUTPATIENT
Start: 2021-06-21 | End: 2021-06-22

## 2021-06-21 RX ORDER — HYDROMORPHONE HYDROCHLORIDE 2 MG/ML
0.5 INJECTION INTRAMUSCULAR; INTRAVENOUS; SUBCUTANEOUS
Refills: 0 | Status: DISCONTINUED | OUTPATIENT
Start: 2021-06-21 | End: 2021-06-21

## 2021-06-21 RX ORDER — POLYETHYLENE GLYCOL 3350 17 G/17G
17 POWDER, FOR SOLUTION ORAL DAILY
Refills: 0 | Status: DISCONTINUED | OUTPATIENT
Start: 2021-06-21 | End: 2021-06-21

## 2021-06-21 RX ORDER — TRAMADOL HYDROCHLORIDE 50 MG/1
1 TABLET ORAL
Qty: 0 | Refills: 0 | DISCHARGE
Start: 2021-06-21

## 2021-06-21 RX ORDER — POLYETHYLENE GLYCOL 3350 17 G/17G
17 POWDER, FOR SOLUTION ORAL
Qty: 0 | Refills: 0 | DISCHARGE
Start: 2021-06-21

## 2021-06-21 RX ADMIN — Medication 975 MILLIGRAM(S): at 06:34

## 2021-06-21 RX ADMIN — OXYCODONE HYDROCHLORIDE 5 MILLIGRAM(S): 5 TABLET ORAL at 07:04

## 2021-06-21 RX ADMIN — RIVAROXABAN 10 MILLIGRAM(S): KIT at 11:31

## 2021-06-21 RX ADMIN — Medication 25 MICROGRAM(S): at 06:35

## 2021-06-21 RX ADMIN — Medication 975 MILLIGRAM(S): at 13:02

## 2021-06-21 RX ADMIN — Medication 100 MILLIGRAM(S): at 06:35

## 2021-06-21 RX ADMIN — Medication 975 MILLIGRAM(S): at 07:04

## 2021-06-21 RX ADMIN — SODIUM CHLORIDE 100 MILLILITER(S): 9 INJECTION, SOLUTION INTRAVENOUS at 06:34

## 2021-06-21 RX ADMIN — OXYCODONE HYDROCHLORIDE 5 MILLIGRAM(S): 5 TABLET ORAL at 06:34

## 2021-06-21 RX ADMIN — Medication 25 MILLIGRAM(S): at 21:40

## 2021-06-21 RX ADMIN — HYDROMORPHONE HYDROCHLORIDE 0.5 MILLIGRAM(S): 2 INJECTION INTRAMUSCULAR; INTRAVENOUS; SUBCUTANEOUS at 02:15

## 2021-06-21 RX ADMIN — HYDROMORPHONE HYDROCHLORIDE 0.5 MILLIGRAM(S): 2 INJECTION INTRAMUSCULAR; INTRAVENOUS; SUBCUTANEOUS at 02:00

## 2021-06-21 RX ADMIN — TRAMADOL HYDROCHLORIDE 50 MILLIGRAM(S): 50 TABLET ORAL at 21:40

## 2021-06-21 RX ADMIN — POLYETHYLENE GLYCOL 3350 17 GRAM(S): 17 POWDER, FOR SOLUTION ORAL at 11:31

## 2021-06-21 RX ADMIN — Medication 975 MILLIGRAM(S): at 14:19

## 2021-06-21 RX ADMIN — TRAMADOL HYDROCHLORIDE 50 MILLIGRAM(S): 50 TABLET ORAL at 22:15

## 2021-06-21 RX ADMIN — TRAMADOL HYDROCHLORIDE 50 MILLIGRAM(S): 50 TABLET ORAL at 11:50

## 2021-06-21 RX ADMIN — Medication 1 DROP(S): at 09:56

## 2021-06-21 RX ADMIN — HYDROMORPHONE HYDROCHLORIDE 0.5 MILLIGRAM(S): 2 INJECTION INTRAMUSCULAR; INTRAVENOUS; SUBCUTANEOUS at 01:58

## 2021-06-21 RX ADMIN — Medication 25 MILLIGRAM(S): at 09:56

## 2021-06-21 RX ADMIN — Medication 1 TABLET(S): at 11:30

## 2021-06-21 RX ADMIN — SODIUM CHLORIDE 100 MILLILITER(S): 9 INJECTION, SOLUTION INTRAVENOUS at 01:55

## 2021-06-21 RX ADMIN — TRAMADOL HYDROCHLORIDE 50 MILLIGRAM(S): 50 TABLET ORAL at 14:24

## 2021-06-21 RX ADMIN — SENNA PLUS 2 TABLET(S): 8.6 TABLET ORAL at 21:41

## 2021-06-21 RX ADMIN — Medication 100 MILLIGRAM(S): at 13:01

## 2021-06-21 RX ADMIN — HYDROMORPHONE HYDROCHLORIDE 0.5 MILLIGRAM(S): 2 INJECTION INTRAMUSCULAR; INTRAVENOUS; SUBCUTANEOUS at 01:41

## 2021-06-21 RX ADMIN — Medication 2000 UNIT(S): at 11:31

## 2021-06-21 NOTE — PROGRESS NOTE ADULT - ASSESSMENT
A/P: 71yMale POD1 s/p R femur IMN, recovering well    - Pain control  - WBAT RLE  - PT/OT  - OOB/AAT  - Regular Kosher diet  - Xarelto for DVT ppx  - Monitor I&Os.

## 2021-06-21 NOTE — DISCHARGE NOTE PROVIDER - NSDCMRMEDTOKEN_GEN_ALL_CORE_FT
acetaminophen 325 mg oral tablet: 3 tab(s) orally every 8 hours x 7 days, then as needed  calcium-vitamin D 500 mg-200 intl units (5 mcg) oral tablet: 1 tab(s) orally once a day  oxyCODONE 5 mg oral tablet: 1 tab(s) orally every 4 hours, As needed, Severe Pain (7 - 10)  polyethylene glycol 3350 oral powder for reconstitution: 17 gram(s) orally once a day, As Needed  rivaroxaban 10 mg oral tablet: 1 tab(s) orally once a day  senna oral tablet: 2 tab(s) orally once a day (at bedtime)  Synthroid 25 mcg (0.025 mg) oral tablet: 1 tab(s) orally once a day  traMADol 50 mg oral tablet: 1 tab(s) orally every 4 hours, As needed, Mild Pain (1 - 3)   acetaminophen 325 mg oral tablet: 3 tab(s) orally every 8 hours x 7 days, then as needed  calcium-vitamin D 500 mg-200 intl units (5 mcg) oral tablet: 1 tab(s) orally once a day  oxyCODONE 5 mg oral tablet: 1 tab(s) orally every 4 hours, As needed, Severe Pain (7 - 10) MDD:5  polyethylene glycol 3350 oral powder for reconstitution: 17 gram(s) orally once a day, As Needed  Protonix 40 mg oral delayed release tablet: 1 tab(s) orally once a day MDD:1  rivaroxaban 10 mg oral tablet: 1 tab(s) orally once a day MDD:1  senna oral tablet: 2 tab(s) orally once a day (at bedtime)  Synthroid 25 mcg (0.025 mg) oral tablet: 1 tab(s) orally once a day  traMADol 50 mg oral tablet: 1 tab(s) orally every 6 hours, As Needed -Mild Pain (1 - 3) MDD:4

## 2021-06-21 NOTE — OCCUPATIONAL THERAPY INITIAL EVALUATION ADULT - PERTINENT HX OF CURRENT PROBLEM, REHAB EVAL
71[facility] and inability to ambulate.  Patient is a community ambulator at baseline with/without assistive devices. Intramedullary nailing of right femur 21-Jun-2021

## 2021-06-21 NOTE — DISCHARGE NOTE PROVIDER - CARE PROVIDER_API CALL
Pk Vergara)  Orthopaedic Surgery  [address] [place] [address], [address]  [place] [address]  Phone: [phone]  Fax: [phone]  Follow Up Time:

## 2021-06-21 NOTE — DISCHARGE NOTE PROVIDER - NSDCFUADDINST_GEN_ALL_CORE_FT
Dressing will be changed during office visit. Out of bed, ambulate, weight bearing as tolerated- Physical therapy to assist with exercise and help increase endurance.

## 2021-06-21 NOTE — CONSULT NOTE ADULT - SUBJECTIVE AND OBJECTIVE BOX
HPI:   Patient is a 71y male with history of rheumatic fever at age 5, history of prostate cancer s/p prostatectomy in March complicated by a lymphocoele and post  obstructive diuresis  but no infection. He has a radiculopathy and yesterday after getting off a boat he had a very abrupt fall in that his right leg gave out. He had a resultant right femur fracture and underwent orif yesterday. He had cefazolin surgical site prophylaxis as per protocol with 2 doses post op. Patient was concerned he needed prolonged prophylaxis due to the lympocoele and RF history. He has no fever or abdomen pain and felt otherwise well before he fell. He urinates well.     REVIEW OF SYSTEMS:  All other review of systems negative (Comprehensive ROS)    PAST MEDICAL & SURGICAL HISTORY:  Prostate cancer    Hypothyroid    H/O prostatectomy        Allergies    No Known Allergies    Intolerances        Antimicrobials Day #  :    Other Medications:  acetaminophen   Tablet .. 975 milliGRAM(s) Oral every 8 hours  artificial tears (preservative free) Ophthalmic Solution 1 Drop(s) Both EYES three times a day PRN  benzocaine 15 mG/menthol 3.6 mG (Sugar-Free) Lozenge 1 Lozenge Oral every 3 hours PRN  calcium carbonate 1250 mG  + Vitamin D (OsCal 500 + D) 1 Tablet(s) Oral daily  cholecalciferol 2000 Unit(s) Oral daily  diphenhydrAMINE 25 milliGRAM(s) Oral every 4 hours PRN  lactated ringers. 1000 milliLiter(s) IV Continuous <Continuous>  levothyroxine 25 MICROGram(s) Oral daily  magnesium hydroxide Suspension 30 milliLiter(s) Oral daily PRN  melatonin 3 milliGRAM(s) Oral at bedtime PRN  ondansetron Injectable 4 milliGRAM(s) IV Push every 6 hours PRN  oxyCODONE    IR 2.5 milliGRAM(s) Oral every 4 hours PRN  oxyCODONE    IR 5 milliGRAM(s) Oral every 4 hours PRN  polyethylene glycol 3350 17 Gram(s) Oral daily  rivaroxaban 10 milliGRAM(s) Oral daily  senna 2 Tablet(s) Oral at bedtime  traMADol 50 milliGRAM(s) Oral every 4 hours PRN      FAMILY HISTORY:      SOCIAL HISTORY:  Smoking: [ ]Yes [x ]No  ETOH: [ ]Yes [ x]No  Drug Use: [ ]Yes [ x]No   x[ ] Single[ ]    T(F): 97.4 (21 @ 14:02), Max: 99.3 (21 @ 01:15)  HR: 77 (21 @ 14:02)  BP: 106/61 (21 @ 14:02)  RR: 18 (21 @ 14:02)  SpO2: 94% (21 @ 14:02)  Wt(kg): --    PHYSICAL EXAM:  General: alert, no acute distress  Eyes:  anicteric, no conjunctival injection, no discharge  Oropharynx: no lesions or injection 	  Neck: supple, without adenopathy  Lungs: clear to auscultation  Heart: regular rate and rhythm; no murmur, rubs or gallops  Abdomen: soft, nondistended, nontender, without mass or organomegaly  Skin: no lesions  Extremities: no clubbing, cyanosis, or edema  Neurologic: alert, oriented, moves all extremities    LAB RESULTS:                        13.5   11.06 )-----------( 274      ( 2021 05:54 )             40.5     06-21    135  |  99  |  12  ----------------------------<  148<H>  4.0   |  21<L>  |  0.82    Ca    9.3      2021 05:54    TPro  7.4  /  Alb  4.6  /  TBili  0.6  /  DBili  x   /  AST  24  /  ALT  16  /  AlkPhos  66  06-20    LIVER FUNCTIONS - ( 2021 18:00 )  Alb: 4.6 g/dL / Pro: 7.4 g/dL / ALK PHOS: 66 U/L / ALT: 16 U/L / AST: 24 U/L / GGT: x           Urinalysis Basic - ( 2021 22:02 )    Color: Colorless / Appearance: Clear / S.006 / pH: x  Gluc: x / Ketone: Small  / Bili: Negative / Urobili: Negative   Blood: x / Protein: Negative / Nitrite: Negative   Leuk Esterase: Negative / RBC: x / WBC x   Sq Epi: x / Non Sq Epi: x / Bacteria: x        MICROBIOLOGY:  RECENT CULTURES:        RADIOLOGY REVIEWED:  < from: Xray Femur 2 Views, Right (21 @ 18:19) >    EXAM:  XR HIP WITH PELV 2-3V RT                          EXAM:  XR FEMUR 2 VIEWS RT                            PROCEDURE DATE:  2021            INTERPRETATION:  CLINICAL INFORMATION: Right hip pain. Evaluate for fracture    TECHNIQUE: XR FEMUR 2 VIEWS RIGHT, XR HIP WITH PELVIS 2 OR 3 VIEWS RIGHT    COMPARISON: None    IMPRESSION:    There is a mildly displaced intertrochanteric fracture of the right hip. There is moderate degenerative change of the left hip. There is degenerative change of the lower lumbar spine.    < end of copied text >          Impression:    Patient s/p prostatectomy for cancer back in march, post op post obstructive diuresis and a lymphocoele that is getting smaller and has not been infected. He had a fall yesterday after getting off a boat and has a radiculopathy with resultant right intertrochanteric fractrue. He underwent orif last night and has received the 3 doses of ancef as per surgical site prophylaxis protocol. there is no indication for extended post operative antibiotics.       Recommendations:  monitor off further antibiotics  monitor for urinary retention

## 2021-06-21 NOTE — BRIEF OPERATIVE NOTE - ESTIMATED BLOOD LOSS
ENDOCRINOLOGY INTAKE FORM    Patient Name:  Annabella Bolanos  :  1956    Is patient Diabetic?   No  Does patient have non-diabetic or other endocrine issues?  Yes: subclinical hyperthyroidism    Vitals: There were no vitals taken for this visit.  BMI= There is no height or weight on file to calculate BMI.    Smoking and Alcohol use:  Social History     Tobacco Use     Smoking status: Never Smoker     Smokeless tobacco: Never Used   Substance Use Topics     Alcohol use: Yes     Comment: 2 drinks per week -      Drug use: No     [facility]  [place]     49

## 2021-06-21 NOTE — DISCHARGE NOTE PROVIDER - NSDCCPTREATMENT_GEN_ALL_CORE_FT
PRINCIPAL PROCEDURE  Procedure: Intramedullary nailing of right femur  Findings and Treatment:

## 2021-06-21 NOTE — PHYSICAL THERAPY INITIAL EVALUATION ADULT - PERTINENT HX OF CURRENT PROBLEM, REHAB EVAL
71[facility] severe R hip pain and inability to ambulate. Patient denies headstrike or LOC. Patient denies radiation of pain. Patient denies numbness/tingling/burning in the RLE. No other bone/joint complaints. Patient is a community ambulator at baseline with/without assistive devices. Pt is now POD1 s/p R Hip IMN.

## 2021-06-21 NOTE — DISCHARGE NOTE PROVIDER - NSDCCPCAREPLAN_GEN_ALL_CORE_FT
PRINCIPAL DISCHARGE DIAGNOSIS  Diagnosis: Intertrochanteric fracture of right femur  Assessment and Plan of Treatment:

## 2021-06-21 NOTE — PHYSICAL THERAPY INITIAL EVALUATION ADULT - ADDITIONAL COMMENTS
Pt lives with spouse in apt with 0 SEBASTIAN and 0 within home. PTA, pt independent in all functional mobility without use of AD.

## 2021-06-21 NOTE — BRIEF OPERATIVE NOTE - NSICDXBRIEFPOSTOP_GEN_ALL_CORE_FT
POST-OP DIAGNOSIS:  Intertrochanteric fracture of right hip 21-Jun-2021 01:01:19  Sudhakar Diamond

## 2021-06-21 NOTE — DISCHARGE NOTE PROVIDER - HOSPITAL COURSE
History of Present Illness:   71y Male presents s/p [facility] fall c/o severe R hip pain and inability to ambulate.  Patient denies headstrike or LOC. Patient denies radiation of pain. Patient denies numbness/tingling/burning in the RLE. No other bone/joint complaints. Patient is a community ambulator at baseline with/without assistive devices.    Allergies and Intolerances:        Allergies:  	No Known Allergies:   .  Patient History:   Past Medical, Past Surgical, and Family History   PAST MEDICAL HISTORY:  Hypothyroid     Prostate cancer.     PAST SURGICAL HISTORY:  H/O prostatectomy.    6/20/21: Patient presents to the hospital after a fall with right hip pain, x-ray reveals right hip fracture. Patient  was admitted and medically cleared for surgery. Patient was taken to surgery and fixed with IM Nail-   -patient tolerated procedure without complications.  Patient was evaluated by Physical and Occupational therapy whom recommended home without patient PT   for discharge plan.  Patient is stable for discharge when cleared by PT.

## 2021-06-21 NOTE — DISCHARGE NOTE PROVIDER - NSDCFUADDAPPT_GEN_ALL_CORE_FT
Please call Dr. Vergara' s office within next few days to schedule a follow up appointment about 14 days after surgery.  Please call Dr. Vergara' s office within next few days to schedule a follow up appointment about 14 days after surgery.   Recommend follow up with medical MD, within next 4 weeks.

## 2021-06-22 VITALS
TEMPERATURE: 99 F | HEART RATE: 74 BPM | SYSTOLIC BLOOD PRESSURE: 107 MMHG | OXYGEN SATURATION: 98 % | DIASTOLIC BLOOD PRESSURE: 65 MMHG | RESPIRATION RATE: 18 BRPM

## 2021-06-22 LAB
ANION GAP SERPL CALC-SCNC: 11 MMOL/L — SIGNIFICANT CHANGE UP (ref 5–17)
BASOPHILS # BLD AUTO: 0.04 K/UL — SIGNIFICANT CHANGE UP (ref 0–0.2)
BASOPHILS NFR BLD AUTO: 0.6 % — SIGNIFICANT CHANGE UP (ref 0–2)
BUN SERPL-MCNC: 14 MG/DL — SIGNIFICANT CHANGE UP (ref 7–23)
CALCIUM SERPL-MCNC: 9.1 MG/DL — SIGNIFICANT CHANGE UP (ref 8.4–10.5)
CHLORIDE SERPL-SCNC: 100 MMOL/L — SIGNIFICANT CHANGE UP (ref 96–108)
CO2 SERPL-SCNC: 24 MMOL/L — SIGNIFICANT CHANGE UP (ref 22–31)
CREAT SERPL-MCNC: 0.92 MG/DL — SIGNIFICANT CHANGE UP (ref 0.5–1.3)
EOSINOPHIL # BLD AUTO: 0.26 K/UL — SIGNIFICANT CHANGE UP (ref 0–0.5)
EOSINOPHIL NFR BLD AUTO: 3.9 % — SIGNIFICANT CHANGE UP (ref 0–6)
GLUCOSE SERPL-MCNC: 102 MG/DL — HIGH (ref 70–99)
HCT VFR BLD CALC: 35.9 % — LOW (ref 39–50)
HGB BLD-MCNC: 11.9 G/DL — LOW (ref 13–17)
IMM GRANULOCYTES NFR BLD AUTO: 0.6 % — SIGNIFICANT CHANGE UP (ref 0–1.5)
LYMPHOCYTES # BLD AUTO: 0.7 K/UL — LOW (ref 1–3.3)
LYMPHOCYTES # BLD AUTO: 10.4 % — LOW (ref 13–44)
MCHC RBC-ENTMCNC: 29.5 PG — SIGNIFICANT CHANGE UP (ref 27–34)
MCHC RBC-ENTMCNC: 33.1 GM/DL — SIGNIFICANT CHANGE UP (ref 32–36)
MCV RBC AUTO: 89.1 FL — SIGNIFICANT CHANGE UP (ref 80–100)
MONOCYTES # BLD AUTO: 0.75 K/UL — SIGNIFICANT CHANGE UP (ref 0–0.9)
MONOCYTES NFR BLD AUTO: 11.1 % — SIGNIFICANT CHANGE UP (ref 2–14)
NEUTROPHILS # BLD AUTO: 4.96 K/UL — SIGNIFICANT CHANGE UP (ref 1.8–7.4)
NEUTROPHILS NFR BLD AUTO: 73.4 % — SIGNIFICANT CHANGE UP (ref 43–77)
NRBC # BLD: 0 /100 WBCS — SIGNIFICANT CHANGE UP (ref 0–0)
PLATELET # BLD AUTO: 220 K/UL — SIGNIFICANT CHANGE UP (ref 150–400)
POTASSIUM SERPL-MCNC: 4 MMOL/L — SIGNIFICANT CHANGE UP (ref 3.5–5.3)
POTASSIUM SERPL-SCNC: 4 MMOL/L — SIGNIFICANT CHANGE UP (ref 3.5–5.3)
RBC # BLD: 4.03 M/UL — LOW (ref 4.2–5.8)
RBC # FLD: 13.7 % — SIGNIFICANT CHANGE UP (ref 10.3–14.5)
SODIUM SERPL-SCNC: 135 MMOL/L — SIGNIFICANT CHANGE UP (ref 135–145)
WBC # BLD: 6.75 K/UL — SIGNIFICANT CHANGE UP (ref 3.8–10.5)
WBC # FLD AUTO: 6.75 K/UL — SIGNIFICANT CHANGE UP (ref 3.8–10.5)

## 2021-06-22 PROCEDURE — 80053 COMPREHEN METABOLIC PANEL: CPT

## 2021-06-22 PROCEDURE — 86901 BLOOD TYPING SEROLOGIC RH(D): CPT

## 2021-06-22 PROCEDURE — 80048 BASIC METABOLIC PNL TOTAL CA: CPT

## 2021-06-22 PROCEDURE — 85027 COMPLETE CBC AUTOMATED: CPT

## 2021-06-22 PROCEDURE — C1713: CPT

## 2021-06-22 PROCEDURE — 86803 HEPATITIS C AB TEST: CPT

## 2021-06-22 PROCEDURE — 71045 X-RAY EXAM CHEST 1 VIEW: CPT

## 2021-06-22 PROCEDURE — 81003 URINALYSIS AUTO W/O SCOPE: CPT

## 2021-06-22 PROCEDURE — 85730 THROMBOPLASTIN TIME PARTIAL: CPT

## 2021-06-22 PROCEDURE — 99285 EMERGENCY DEPT VISIT HI MDM: CPT

## 2021-06-22 PROCEDURE — 87086 URINE CULTURE/COLONY COUNT: CPT

## 2021-06-22 PROCEDURE — 86900 BLOOD TYPING SEROLOGIC ABO: CPT

## 2021-06-22 PROCEDURE — 85610 PROTHROMBIN TIME: CPT

## 2021-06-22 PROCEDURE — 97165 OT EVAL LOW COMPLEX 30 MIN: CPT

## 2021-06-22 PROCEDURE — 76000 FLUOROSCOPY <1 HR PHYS/QHP: CPT

## 2021-06-22 PROCEDURE — 73502 X-RAY EXAM HIP UNI 2-3 VIEWS: CPT

## 2021-06-22 PROCEDURE — 73552 X-RAY EXAM OF FEMUR 2/>: CPT

## 2021-06-22 PROCEDURE — U0003: CPT

## 2021-06-22 PROCEDURE — C1889: CPT

## 2021-06-22 PROCEDURE — 97161 PT EVAL LOW COMPLEX 20 MIN: CPT

## 2021-06-22 PROCEDURE — 85025 COMPLETE CBC W/AUTO DIFF WBC: CPT

## 2021-06-22 PROCEDURE — G0103: CPT

## 2021-06-22 PROCEDURE — 86850 RBC ANTIBODY SCREEN: CPT

## 2021-06-22 PROCEDURE — 86769 SARS-COV-2 COVID-19 ANTIBODY: CPT

## 2021-06-22 RX ORDER — RIVAROXABAN 15 MG-20MG
1 KIT ORAL
Qty: 41 | Refills: 0
Start: 2021-06-22 | End: 2021-08-01

## 2021-06-22 RX ORDER — PANTOPRAZOLE SODIUM 20 MG/1
1 TABLET, DELAYED RELEASE ORAL
Qty: 30 | Refills: 0
Start: 2021-06-22 | End: 2021-07-21

## 2021-06-22 RX ORDER — TRAMADOL HYDROCHLORIDE 50 MG/1
1 TABLET ORAL
Qty: 28 | Refills: 0
Start: 2021-06-22 | End: 2021-06-28

## 2021-06-22 RX ORDER — OXYCODONE HYDROCHLORIDE 5 MG/1
1 TABLET ORAL
Qty: 180 | Refills: 0
Start: 2021-06-22 | End: 2021-07-21

## 2021-06-22 RX ORDER — OXYCODONE HYDROCHLORIDE 5 MG/1
1 TABLET ORAL
Qty: 30 | Refills: 0
Start: 2021-06-22

## 2021-06-22 RX ADMIN — TRAMADOL HYDROCHLORIDE 50 MILLIGRAM(S): 50 TABLET ORAL at 12:30

## 2021-06-22 RX ADMIN — Medication 25 MICROGRAM(S): at 05:16

## 2021-06-22 RX ADMIN — TRAMADOL HYDROCHLORIDE 50 MILLIGRAM(S): 50 TABLET ORAL at 06:00

## 2021-06-22 RX ADMIN — TRAMADOL HYDROCHLORIDE 50 MILLIGRAM(S): 50 TABLET ORAL at 11:34

## 2021-06-22 RX ADMIN — POLYETHYLENE GLYCOL 3350 17 GRAM(S): 17 POWDER, FOR SOLUTION ORAL at 11:34

## 2021-06-22 RX ADMIN — Medication 2000 UNIT(S): at 11:33

## 2021-06-22 RX ADMIN — Medication 1 TABLET(S): at 11:32

## 2021-06-22 RX ADMIN — Medication 975 MILLIGRAM(S): at 13:25

## 2021-06-22 RX ADMIN — RIVAROXABAN 10 MILLIGRAM(S): KIT at 11:33

## 2021-06-22 RX ADMIN — TRAMADOL HYDROCHLORIDE 50 MILLIGRAM(S): 50 TABLET ORAL at 05:16

## 2021-06-22 NOTE — DISCHARGE NOTE NURSING/CASE MANAGEMENT/SOCIAL WORK - PATIENT PORTAL LINK FT
You can access the FollowMyHealth Patient Portal offered by [facility] by registering at the following website: [email or web address]. By joining [employer]’s FollowMyHealth portal, you will also be able to view your health information using other applications (apps) compatible with our system.

## 2021-06-22 NOTE — DISCHARGE NOTE NURSING/CASE MANAGEMENT/SOCIAL WORK - NSDCFUADDAPPT_GEN_ALL_CORE_FT
Please call Dr. Vergara' s office within next few days to schedule a follow up appointment about 14 days after surgery.   Recommend follow up with medical MD, within next 4 weeks.

## 2021-06-22 NOTE — PROGRESS NOTE ADULT - SUBJECTIVE AND OBJECTIVE BOX
Orthopedics      Patient seen and examined at bedside. Feeling well. Pain controlled. No n/v. No acute events overnight.    Vital Signs Last 24 Hrs  T(C): 37 (06-22-21 @ 04:10), Max: 37 (06-22-21 @ 04:10)  T(F): 98.6 (06-22-21 @ 04:10), Max: 98.6 (06-22-21 @ 04:10)  HR: 75 (06-22-21 @ 04:10) (62 - 77)  BP: 114/69 (06-22-21 @ 04:10) (95/60 - 133/70)  BP(mean): --  RR: 18 (06-22-21 @ 04:10) (18 - 18)  SpO2: 96% (06-22-21 @ 04:10) (94% - 97%)                        13.5   11.06 )-----------( 274      ( 21 Jun 2021 05:54 )             40.5     21 Jun 2021 05:54    135    |  99     |  12     ----------------------------<  148    4.0     |  21     |  0.82     Ca    9.3        21 Jun 2021 05:54    TPro  7.4    /  Alb  4.6    /  TBili  0.6    /  DBili  x      /  AST  24     /  ALT  16     /  AlkPhos  66     20 Jun 2021 18:00    PT/INR - ( 20 Jun 2021 18:01 )   PT: 11.4 sec;   INR: 0.95 ratio         PTT - ( 20 Jun 2021 18:01 )  PTT:30.8 sec    Exam:  Gen: NAD, resting comfortably  RLE:  Dressing c/d/i  NTTP calves b/l  +EHL/FHL/TA/GS  SILT L2-S1  Compartments soft and compressible  2+ Pulses palpable      A/P: 71yM POD 2 s/p R IMN  -FU AM labs  -Pain control  -WBAT RLE  -DVT ppx  -Incentive Spirometry  -Elevation to extremity  -Medical management appreciated  
Ortho Progress Note    S: Patient seen and examined. No acute events overnight. Pain well controlled with current regimen. Denies lightheadedness/dizziness, CP/SOB. Tolerating diet.       O:  Physical Exam:  Gen: Laying in bed, NAD, alert and oriented.   Resp: Unlabored breathing  Ext: RLE- dressing c/d/i          EHL/FHL/TA/Sol intact          + SILT DP/SP/DAY/Sa/T          +DP, extremity WWP    Vital Signs Last 24 Hrs  T(C): 36.4 (21 Jun 2021 05:30), Max: 37.4 (21 Jun 2021 01:15)  T(F): 97.6 (21 Jun 2021 05:30), Max: 99.3 (21 Jun 2021 01:15)  HR: 71 (21 Jun 2021 05:30) (64 - 80)  BP: 118/68 (21 Jun 2021 05:30) (110/64 - 148/81)  BP(mean): 98 (21 Jun 2021 02:16) (85 - 102)  RR: 18 (21 Jun 2021 05:30) (15 - 18)  SpO2: 94% (21 Jun 2021 05:30) (93% - 100%)                          13.5   11.06 )-----------( 274      ( 21 Jun 2021 05:54 )             40.5                         13.4   10.27 )-----------( 274      ( 20 Jun 2021 18:00 )             40.5       06-20    133<L>  |  97  |  15  ----------------------------<  96  3.8   |  22  |  0.85        PT/INR - ( 20 Jun 2021 18:01 )   PT: 11.4 sec;   INR: 0.95 ratio         PTT - ( 20 Jun 2021 18:01 )  PTT:30.8 sec

## 2021-11-24 NOTE — ED ADULT TRIAGE NOTE - WEIGHT IN KG
"Subjective   Patient is a 54-year-old male who presents to the emergency room today with complaints of knee pain.  Patient states that he has been having intermittent knee pain for as long as he can remember. Patient reports that he tripped and busted his lip and was evaluated at  today. He shares that because of everything that is \"going to happen to him\" if he had a way to take his life he would. He is concerned about having his penis cut off as he heard about this at [facility] for the last month.           Review of Systems   Constitutional: Negative.    HENT: Negative.    Respiratory: Negative.    Cardiovascular: Negative.    Gastrointestinal: Negative.    Genitourinary: Negative.    Musculoskeletal: Positive for arthralgias and myalgias.   Skin: Positive for wound.   Psychiatric/Behavioral: Positive for behavioral problems, self-injury and suicidal ideas.       Past Medical History:   Diagnosis Date   • Hypertension    • OCD (obsessive compulsive disorder)        Allergies   Allergen Reactions   • Amoxicillin Hives       History reviewed. No pertinent surgical history.    History reviewed. No pertinent family history.    Social History     Socioeconomic History   • Marital status: Single   Tobacco Use   • Smoking status: Former Smoker   Substance and Sexual Activity   • Alcohol use: Not Currently     Alcohol/week: 3.0 standard drinks     Types: 3 Shots of liquor per week     Comment: SOBER FOR 6 MONTHS   • Drug use: Not Currently           Objective   Physical Exam  Vitals and nursing note reviewed.   Constitutional:       General: He is not in acute distress.     Appearance: Normal appearance. He is not ill-appearing or toxic-appearing.   HENT:      Head: Normocephalic and atraumatic.      Nose: Nose normal.      Mouth/Throat:      Mouth: Mucous membranes are moist.   Eyes:      Extraocular Movements: Extraocular movements intact.      Conjunctiva/sclera: Conjunctivae normal.   Cardiovascular:      Rate and " Rhythm: Normal rate.   Pulmonary:      Effort: Pulmonary effort is normal. No respiratory distress.   Musculoskeletal:         General: Normal range of motion.      Cervical back: Normal range of motion.   Skin:     General: Skin is warm and dry.   Neurological:      General: No focal deficit present.      Mental Status: He is alert.   Psychiatric:         Attention and Perception: Attention and perception normal.         Behavior: Behavior is agitated. Behavior is cooperative.         Thought Content: Thought content is paranoid. Thought content includes suicidal ideation. Thought content does not include homicidal ideation. Thought content does not include homicidal or suicidal plan.         Procedures           ED Course  ED Course as of 11/24/21 1933 Wed Nov 24, 2021   3353 Behavioral health paged [JG]   1447 Patient currently being evaluated by behavioral health at bedside via tablet device [JG]   1762 Contacted by behavioral health who is recommending inpatient treatment and will present patient to several facilities for behavioral health treatment. [JG]   1932 Patient accepted for transfer to [facility] under accepting physician Dr. Alamo and transferred accordingly.  [JG]      ED Course User Index  [JG] Sukhdeep George PA      Recent Results (from the past 24 hour(s))   COVID-19 and FLU A/B PCR - Swab, Nasopharynx    Collection Time: 11/23/21 11:41 PM    Specimen: Nasopharynx; Swab   Result Value Ref Range    COVID19 Not Detected Not Detected - Ref. Range    Influenza A PCR Not Detected Not Detected    Influenza B PCR Not Detected Not Detected   Comprehensive Metabolic Panel    Collection Time: 11/23/21 11:45 PM    Specimen: Blood   Result Value Ref Range    Glucose 113 (H) 65 - 99 mg/dL    BUN 22 (H) 6 - 20 mg/dL    Creatinine 0.95 0.76 - 1.27 mg/dL    Sodium 142 136 - 145 mmol/L    Potassium 3.6 3.5 - 5.2 mmol/L    Chloride 104 98 - 107 mmol/L    CO2 22.0 22.0 - 29.0 mmol/L    Calcium 9.4 8.6 - 10.5  mg/dL    Total Protein 6.8 6.0 - 8.5 g/dL    Albumin 4.40 3.50 - 5.20 g/dL    ALT (SGPT) 10 1 - 41 U/L    AST (SGOT) 19 1 - 40 U/L    Alkaline Phosphatase 58 39 - 117 U/L    Total Bilirubin 0.8 0.0 - 1.2 mg/dL    eGFR Non African Amer 82 >60 mL/min/1.73    Globulin 2.4 gm/dL    A/G Ratio 1.8 g/dL    BUN/Creatinine Ratio 23.2 7.0 - 25.0    Anion Gap 16.0 (H) 5.0 - 15.0 mmol/L   Acetaminophen Level    Collection Time: 11/23/21 11:45 PM    Specimen: Blood   Result Value Ref Range    Acetaminophen <5.0 0.0 - 30.0 mcg/mL   Ethanol    Collection Time: 11/23/21 11:45 PM    Specimen: Blood   Result Value Ref Range    Ethanol <10 0 - 10 mg/dL   Salicylate Level    Collection Time: 11/23/21 11:45 PM    Specimen: Blood   Result Value Ref Range    Salicylate <0.3 <=30.0 mg/dL   CBC Auto Differential    Collection Time: 11/23/21 11:45 PM    Specimen: Blood   Result Value Ref Range    WBC 11.32 (H) 3.40 - 10.80 10*3/mm3    RBC 5.01 4.14 - 5.80 10*6/mm3    Hemoglobin 15.0 13.0 - 17.7 g/dL    Hematocrit 43.1 37.5 - 51.0 %    MCV 86.0 79.0 - 97.0 fL    MCH 29.9 26.6 - 33.0 pg    MCHC 34.8 31.5 - 35.7 g/dL    RDW 12.7 12.3 - 15.4 %    RDW-SD 39.2 37.0 - 54.0 fl    MPV 8.8 6.0 - 12.0 fL    Platelets 226 140 - 450 10*3/mm3    Neutrophil % 80.8 (H) 42.7 - 76.0 %    Lymphocyte % 11.7 (L) 19.6 - 45.3 %    Monocyte % 7.0 5.0 - 12.0 %    Eosinophil % 0.0 (L) 0.3 - 6.2 %    Basophil % 0.1 0.0 - 1.5 %    Immature Grans % 0.4 0.0 - 0.5 %    Neutrophils, Absolute 9.16 (H) 1.70 - 7.00 10*3/mm3    Lymphocytes, Absolute 1.32 0.70 - 3.10 10*3/mm3    Monocytes, Absolute 0.79 0.10 - 0.90 10*3/mm3    Eosinophils, Absolute 0.00 0.00 - 0.40 10*3/mm3    Basophils, Absolute 0.01 0.00 - 0.20 10*3/mm3    Immature Grans, Absolute 0.04 0.00 - 0.05 10*3/mm3    nRBC 0.0 0.0 - 0.2 /100 WBC   Urinalysis With Microscopic If Indicated (No Culture) - Urine, Clean Catch    Collection Time: 11/24/21  3:23 AM    Specimen: Urine, Clean Catch   Result Value Ref Range     Color, UA Yellow Yellow, Straw    Appearance, UA Clear Clear    pH, UA 5.5 5.0 - 8.0    Specific Gravity, UA 1.026 1.001 - 1.030    Glucose, UA Negative Negative    Ketones, UA 80 mg/dL (3+) (A) Negative    Bilirubin, UA Negative Negative    Blood, UA Trace (A) Negative    Protein,  mg/dL (2+) (A) Negative    Leuk Esterase, UA Negative Negative    Nitrite, UA Negative Negative    Urobilinogen, UA 1.0 E.U./dL 0.2 - 1.0 E.U./dL   Urine Drug Screen - Urine, Clean Catch    Collection Time: 11/24/21  3:23 AM    Specimen: Urine, Clean Catch   Result Value Ref Range    THC, Screen, Urine Negative Negative    Phencyclidine (PCP), Urine Negative Negative    Cocaine Screen, Urine Negative Negative    Methamphetamine, Ur Negative Negative    Opiate Screen Negative Negative    Amphetamine Screen, Urine Negative Negative    Benzodiazepine Screen, Urine Negative Negative    Tricyclic Antidepressants Screen Negative Negative    Methadone Screen, Urine Negative Negative    Barbiturates Screen, Urine Negative Negative    Oxycodone Screen, Urine Negative Negative    Propoxyphene Screen Negative Negative    Buprenorphine, Screen, Urine Negative Negative   Urinalysis, Microscopic Only - Urine, Clean Catch    Collection Time: 11/24/21  3:23 AM    Specimen: Urine, Clean Catch   Result Value Ref Range    RBC, UA 0-2 None Seen, 0-2 /HPF    WBC, UA None Seen None Seen, 0-2 /HPF    Bacteria, UA None Seen None Seen, Trace /HPF    Squamous Epithelial Cells, UA None Seen None Seen, 0-2 /HPF    Hyaline Casts, UA 0-6 0 - 6 /LPF    Methodology Automated Microscopy      Note: In addition to lab results from this visit, the labs listed above may include labs taken at another facility or during a different encounter within the last 24 hours. Please correlate lab times with ED admission and discharge times for further clarification of the services performed during this visit.    No orders to display     Vitals:    11/23/21 2319 11/24/21 7160  11/24/21 1324 11/24/21 1355   BP: (!) 154/101 146/95 (!) 175/112 169/95   BP Location: Right arm Right arm  Right arm   Patient Position: Lying Lying  Lying   Pulse:  83 90 90   Resp:  17 12 18   Temp:    98.5 °F (36.9 °C)   TempSrc:       SpO2:  97% 98% 98%   Weight:       Height:         Medications - No data to display  ECG/EMG Results (last 24 hours)     Procedure Component Value Units Date/Time    ECG 12 Lead [[id number]] Collected: 11/23/21 2347     Updated: 11/23/21 2347        ECG 12 Lead                                                MDM  Number of Diagnoses or Management Options  Paranoid behavior (HCC): new and requires workup  Suicidal ideations: new and requires workup     Amount and/or Complexity of Data Reviewed  Clinical lab tests: reviewed  Tests in the medicine section of CPT®: reviewed    Risk of Complications, Morbidity, and/or Mortality  Presenting problems: high  Diagnostic procedures: moderate  Management options: moderate    Patient Progress  Patient progress: stable      Final diagnoses:   Paranoid behavior (HCC)   Suicidal ideations       ED Disposition  ED Disposition     ED Disposition Condition Comment    Transfer to Another Facility             No follow-up provider specified.       Medication List      No changes were made to your prescriptions during this visit.          Sukhdeep George PA  11/24/21 1933     70.8

## 2022-06-30 NOTE — PHYSICAL THERAPY INITIAL EVALUATION ADULT - BED MOBILITY TRAINING, PT EVAL
Dr Sue Vaughan      Date /Time 6/30/2022       9:37 AM EDT  Name Long Gonzalez             1958   Location  [facility]  MRN [id number]                Chief Complaint   Patient presents with    Knee Pain     Right  - Laceration  4inch          History of Present Illness  Long Gonzalez is a 71 y.o. male who presents with  right knee pain. Sent in consultation by COBY Gan CNP. Occupation: Machine shop  Occupational activities: heavy lifting occasionally. Athletic/exercise activity: no sports. Injury Mechanism:  direct trauma. Worker's Comp. & legal issues:   none. Previous Treatments: Ice, Heat and NSAIDs    Patient presents to the office today for a new problem. Patient sent over by primary care physician for right knee laceration. On 6/18/2022 he was working in his garage. He was using a . It caught kicked back and cut his knee. His laceration is over the medial aspect. He was seen in the emergency room where it was irrigated and then closed. He was on Augmentin for approximately 10 days. Patient does continue to complain of mild drainage from the region. His pain and ecchymosis is improving. Past History  Past Medical History:   Diagnosis Date    Atrial fibrillation ([address]     CAD (coronary artery disease)     Cardiac arrhythmia     Chronic kidney disease     Hyperlipidemia     Hypertension     Kidney stone     Sleep apnea      Past Surgical History:   Procedure Laterality Date    CARDIOVERSION  2013    CARPAL TUNNEL RELEASE Right 09/20/2016    CYSTOSCOPY Right 4/19/13    RIGHT STENT PLACEMENT    KNEE ARTHROSCOPY Left 09/06/2016    LITHOTRIPSY      MOUTH SURGERY       Social History     Tobacco Use    Smoking status: Never Smoker    Smokeless tobacco: Never Used   Substance Use Topics    Alcohol use: Yes     Comment: not daily, occ.       Current Outpatient Medications on File Prior to Visit   Medication Sig Dispense Refill    ondansetron (ZOFRAN ODT) 4 MG disintegrating tablet Take 1 tablet by mouth every 8 hours as needed for Nausea or Vomiting (Patient not taking: Reported on 6/24/2022) 12 tablet 0    rivaroxaban (XARELTO) 20 MG TABS tablet Take 20 mg by mouth daily      chlorthalidone (HYGROTON) 25 MG tablet Take 25 mg by mouth      amLODIPine (NORVASC) 10 MG tablet TAKE 1 TABLET BY MOUTH ONE TIME A DAY  11    atorvastatin (LIPITOR) 40 MG tablet Take 1 tablet by mouth nightly 30 tablet 3     No current facility-administered medications on file prior to visit. ASCVD 10-YEAR RISK SCORE  The ASCVD Risk score (Abel Maldonado, et al., 2013) failed to calculate for the following reasons: The patient has a prior MI or stroke diagnosis     Review of Systems  10-point ROS is negative other than HPI. Physical Exam  Based off 1997 Exam Criteria  Ht 6' 2\" (1.88 m)   Wt 273 lb (123.8 kg)   BMI 35.05 kg/m²      Constitutional:       General: He is not in acute distress. Appearance: Normal appearance. Cardiovascular:      Rate and Rhythm: Normal rate and regular rhythm. Pulses: Normal pulses. Pulmonary:      Effort: Pulmonary effort is normal. No respiratory distress. Neurological:      Mental Status: He is alert and oriented to person, place, and time. Mental status is at baseline. Skin: Mean, dry, intact. No open sores  Lymphatics: No palpable lymph nodes    Musculoskeletal:  Gait:  antalgic  Lumbar spine: There is no swelling, warmth, or erythema. Range of motion is within normal limits. There is no paraspinal or spinous process tenderness. . The distal neurovascular exam is grossly intact and symmetric. Kye Hip: Examination of the right and left hip reveals intact skin. The patient demonstrates full painless range of motion with regards to flexion, abduction, internal and external rotation. There is no tenderness about the greater trochanter.     R Knee: Physical exam of the knee demonstrates painful range of motion 5-110. Tenderness over the medial joint line. No gross instability to either varus or valgus stress test.  There is a closed laceration medial knee. Mild to moderate ecchymosis surrounding it. Mild serous bloody drainage. No purulent drainage. Central portion of the incision is closed but tissue appears more granular lytic than normal.        Imaging  Right femur: [facility]  Radiographs: X-rays were reviewed from the emergency room 2 views of the femur. They demonstrate several small osseous densities medial.  This could possibly represent bone trauma from the above-mentioned injury. He does have at least moderate to advanced medial joint space osteoarthritis also    Procedure:  No orders of the defined types were placed in this encounter. Assessment and Plan  Mayelin Avila was seen today for knee pain. Diagnoses and all orders for this visit:    Laceration of skin of right knee, initial encounter    Localized osteoarthritis of right knee    Other orders  -     sulfamethoxazole-trimethoprim (BACTRIM DS) 800-160 MG per tablet; Take 1 tablet by mouth 2 times daily for 10 days  -     cephALEXin (KEFLEX) 500 MG capsule; Take 1 capsule by mouth 4 times daily for 10 days        I am concerned with the patient's continued drainage that he may be developing a deep space infection. We will place him back on antibiotics. They are changed to Bactrim and Keflex. I also do not feel that his sutures are ready to be removed. Patient will keep a dry dressing with a nonadhesive over the sutures. He will follow-up weekly at first.  If symptoms worsen he should call the office immediately. He may require an irrigation and debridement at some point in the future. Also patient's right knee osteoarthritis may be partially contributing to his pain symptoms.     I discussed with Jolly Rolle that his history, symptoms, signs and imaging are most consistent with knee arthritis and laceration. We reviewed the natural history of these conditions and treatment options ranging from conservative measures (rest, icing, activity modification, physical therapy, pain meds, cortisone injection) to surgical options. In terms of treatment, I recommended continuing with rest, icing, avoidance of painful activities, NSAIDs or pain meds as tolerated, and physical therapy. If these are not effective, cortisone injection can be considered. We discussed surgical options as well, should conservative measures fail. Electronically signed by Fernanda Alcaraz PA-C on 6/30/2022 at 9:37 AM  This dictation was generated by voice recognition computer software. Although all attempts are made to edit the dictation for accuracy, there may be errors in the transcription that are not intended. GOAL: Pt will perform bed mobility independently in 4 weeks.

## 2023-11-29 ENCOUNTER — OFFICE (OUTPATIENT)
Dept: URBAN - METROPOLITAN AREA CLINIC 28 | Facility: CLINIC | Age: 73
Setting detail: OPHTHALMOLOGY
End: 2023-11-29
Payer: MEDICARE

## 2023-11-29 ENCOUNTER — RX ONLY (RX ONLY)
Age: 73
End: 2023-11-29

## 2023-11-29 DIAGNOSIS — H01.004: ICD-10-CM

## 2023-11-29 DIAGNOSIS — H16.223: ICD-10-CM

## 2023-11-29 DIAGNOSIS — H01.001: ICD-10-CM

## 2023-11-29 DIAGNOSIS — D31.31: ICD-10-CM

## 2023-11-29 DIAGNOSIS — H25.13: ICD-10-CM

## 2023-11-29 DIAGNOSIS — H02.403: ICD-10-CM

## 2023-11-29 PROCEDURE — 92202 OPSCPY EXTND ON/MAC DRAW: CPT | Performed by: OPHTHALMOLOGY

## 2023-11-29 PROCEDURE — 92004 COMPRE OPH EXAM NEW PT 1/>: CPT | Performed by: OPHTHALMOLOGY

## 2023-11-29 ASSESSMENT — CONFRONTATIONAL VISUAL FIELD TEST (CVF)
OD_FINDINGS: FULL
OS_FINDINGS: FULL

## 2023-11-29 ASSESSMENT — TEAR BREAK UP TIME (TBUT)
OS_TBUT: 1+
OD_TBUT: 1+

## 2023-11-29 ASSESSMENT — LID POSITION - PTOSIS
OD_PTOSIS: RUL T 1+
OS_PTOSIS: LUL T 1+

## 2023-11-29 ASSESSMENT — LID EXAM ASSESSMENTS
OD_MEIBOMITIS: 1+
OS_BLEPHARITIS: T
OD_BLEPHARITIS: T
OS_MEIBOMITIS: 1+

## 2023-12-04 ASSESSMENT — KERATOMETRY
OS_AXISANGLE_DEGREES: 152
OD_K1POWER_DIOPTERS: 39.75
OD_K2POWER_DIOPTERS: 39.75
OD_AXISANGLE_DEGREES: 090
OS_K1POWER_DIOPTERS: 39.00
OS_K2POWER_DIOPTERS: 40.00

## 2023-12-04 ASSESSMENT — REFRACTION_AUTOREFRACTION
OS_AXIS: 077
OD_CYLINDER: -1.00
OD_SPHERE: +0.50
OS_SPHERE: +0.25
OD_AXIS: 102
OS_CYLINDER: -1.75

## 2023-12-04 ASSESSMENT — SPHEQUIV_DERIVED
OD_SPHEQUIV: 0
OS_SPHEQUIV: -0.625

## 2023-12-04 ASSESSMENT — AXIALLENGTH_DERIVED
OD_AL: 25.0547
OS_AL: 25.4387

## 2023-12-13 NOTE — PRE-ANESTHESIA EVALUATION ADULT - WEIGHT IN KG
70.8 [Initial Evaluation] : an initial evaluation [Weight Management/Obesity] : weight management/obesity [Other___] : [unfilled]

## 2024-04-19 NOTE — ED ADULT TRIAGE NOTE - 
ADDITIONAL INFORMATION
Collection Time: 04/10/24  8:18 AM   Result Value Ref Range    Sodium 137 136 - 146 mmol/L    Potassium 4.7 3.5 - 5.1 mmol/L    Chloride 105 103 - 113 mmol/L    CO2 28 21 - 32 mmol/L    Anion Gap 4 2 - 11 mmol/L    Glucose 141 (H) 65 - 100 mg/dL    BUN 27 (H) 8 - 23 MG/DL    Creatinine 1.00 0.8 - 1.5 MG/DL    Est, Glom Filt Rate 78 >60 ml/min/1.73m2    Calcium 9.1 8.3 - 10.4 MG/DL    Total Bilirubin 0.5 0.2 - 1.1 MG/DL    ALT 21 12 - 65 U/L    AST 9 (L) 15 - 37 U/L    Alk Phosphatase 119 50 - 136 U/L    Total Protein 6.6 6.3 - 8.2 g/dL    Albumin 3.5 3.2 - 4.6 g/dL    Globulin 3.1 2.8 - 4.5 g/dL    Albumin/Globulin Ratio 1.1 0.4 - 1.6     Hemoglobin A1C    Collection Time: 04/10/24  8:18 AM   Result Value Ref Range    Hemoglobin A1C 7.3 (H) 4.8 - 5.6 %    Estimated Avg Glucose 163 mg/dL   Lipid Panel    Collection Time: 04/10/24  8:18 AM   Result Value Ref Range    Cholesterol, Total 134 <200 MG/DL    Triglycerides 101 35 - 150 MG/DL    HDL 35 (L) 40 - 60 MG/DL    LDL Calculated 78.8 <100 MG/DL    VLDL Cholesterol Calculated 20.2 6.0 - 23.0 MG/DL    Chol/HDL Ratio 3.8         ASSESSMENT and PLAN    Visit Diagnoses and Associated Orders       Primary hypertension        metoprolol (LOPRESSOR) 100 MG tablet [5008]      losartan (COZAAR) 100 MG tablet [32705]      amLODIPine (NORVASC) 10 MG tablet [2630]           Type 2 diabetes mellitus without complication, without long-term current use of insulin (HCC)        metFORMIN (GLUCOPHAGE) 500 MG tablet [53831]      glimepiride (AMARYL) 1 MG tablet [93258]      atorvastatin (LIPITOR) 10 MG tablet [55648]                       Diagnosis Orders   1. Primary hypertension  metoprolol (LOPRESSOR) 100 MG tablet    losartan (COZAAR) 100 MG tablet    amLODIPine (NORVASC) 10 MG tablet      2. Type 2 diabetes mellitus without complication, without long-term current use of insulin (HCC)  metFORMIN (GLUCOPHAGE) 500 MG tablet    glimepiride (AMARYL) 1 MG tablet    atorvastatin 
february 2021

## 2024-12-02 ENCOUNTER — OFFICE (OUTPATIENT)
Dept: URBAN - METROPOLITAN AREA CLINIC 28 | Facility: CLINIC | Age: 74
Setting detail: OPHTHALMOLOGY
End: 2024-12-02
Payer: MEDICARE

## 2024-12-02 DIAGNOSIS — H25.13: ICD-10-CM

## 2024-12-02 DIAGNOSIS — H01.001: ICD-10-CM

## 2024-12-02 DIAGNOSIS — H01.004: ICD-10-CM

## 2024-12-02 DIAGNOSIS — H02.403: ICD-10-CM

## 2024-12-02 DIAGNOSIS — H16.223: ICD-10-CM

## 2024-12-02 DIAGNOSIS — D31.31: ICD-10-CM

## 2024-12-02 PROCEDURE — 92014 COMPRE OPH EXAM EST PT 1/>: CPT | Performed by: OPHTHALMOLOGY

## 2024-12-02 PROCEDURE — 92250 FUNDUS PHOTOGRAPHY W/I&R: CPT | Performed by: OPHTHALMOLOGY

## 2024-12-02 ASSESSMENT — KERATOMETRY
OS_AXISANGLE_DEGREES: 153
OD_K2POWER_DIOPTERS: 39.75
OS_K1POWER_DIOPTERS: 38.75
OS_K2POWER_DIOPTERS: 40.00
OD_K1POWER_DIOPTERS: 39.25
OD_AXISANGLE_DEGREES: 164

## 2024-12-02 ASSESSMENT — TONOMETRY
OS_IOP_MMHG: 11
OD_IOP_MMHG: 12

## 2024-12-02 ASSESSMENT — LID POSITION - PTOSIS
OS_PTOSIS: LUL 1+
OD_PTOSIS: RUL 1+

## 2024-12-02 ASSESSMENT — TEAR BREAK UP TIME (TBUT)
OS_TBUT: 1+
OD_TBUT: 1+

## 2024-12-02 ASSESSMENT — LID EXAM ASSESSMENTS
OS_BLEPHARITIS: T
OD_MEIBOMITIS: 1+
OS_MEIBOMITIS: 1+
OD_BLEPHARITIS: T

## 2024-12-02 ASSESSMENT — VISUAL ACUITY
OS_BCVA: 20/70+1
OD_BCVA: 20/25+1

## 2024-12-02 ASSESSMENT — REFRACTION_AUTOREFRACTION
OD_SPHERE: +0.75
OD_AXIS: 092
OS_CYLINDER: -1.00
OD_CYLINDER: -2.00
OS_SPHERE: -0.25
OS_AXIS: 066

## 2024-12-02 ASSESSMENT — CONFRONTATIONAL VISUAL FIELD TEST (CVF)
OS_FINDINGS: FULL
OD_FINDINGS: FULL